# Patient Record
Sex: MALE | Race: WHITE | NOT HISPANIC OR LATINO | Employment: FULL TIME | URBAN - METROPOLITAN AREA
[De-identification: names, ages, dates, MRNs, and addresses within clinical notes are randomized per-mention and may not be internally consistent; named-entity substitution may affect disease eponyms.]

---

## 2017-01-25 ENCOUNTER — HOSPITAL ENCOUNTER (EMERGENCY)
Facility: HOSPITAL | Age: 52
Discharge: HOME | End: 2017-01-25
Admitting: EMERGENCY MEDICINE
Payer: OTHER MISCELLANEOUS

## 2017-01-25 VITALS
BODY MASS INDEX: 26.36 KG/M2 | HEART RATE: 75 BPM | OXYGEN SATURATION: 97 % | DIASTOLIC BLOOD PRESSURE: 94 MMHG | WEIGHT: 178 LBS | TEMPERATURE: 97.9 F | SYSTOLIC BLOOD PRESSURE: 148 MMHG | HEIGHT: 69 IN | RESPIRATION RATE: 20 BRPM

## 2017-01-25 DIAGNOSIS — S39.012A LOW BACK STRAIN, INITIAL ENCOUNTER: Primary | ICD-10-CM

## 2017-01-25 PROCEDURE — 99283 EMERGENCY DEPT VISIT LOW MDM: CPT

## 2017-01-25 PROCEDURE — 96372 THER/PROPH/DIAG INJ SC/IM: CPT

## 2017-01-25 RX ORDER — DIAZEPAM 5 MG/ML
5 INJECTION, SOLUTION INTRAMUSCULAR; INTRAVENOUS ONCE
Status: COMPLETED | OUTPATIENT
Start: 2017-01-25 | End: 2017-01-25

## 2017-01-25 RX ORDER — KETOROLAC TROMETHAMINE 30 MG/ML
15 INJECTION, SOLUTION INTRAMUSCULAR; INTRAVENOUS ONCE
Status: COMPLETED | OUTPATIENT
Start: 2017-01-25 | End: 2017-01-25

## 2017-01-25 RX ORDER — ESOMEPRAZOLE MAGNESIUM 40 MG/1
40 CAPSULE, DELAYED RELEASE ORAL
COMMUNITY

## 2017-01-25 RX ORDER — BACLOFEN 10 MG/1
10 TABLET ORAL 3 TIMES DAILY
Qty: 12 TABLET | Refills: 0 | Status: SHIPPED | OUTPATIENT
Start: 2017-01-25 | End: 2019-01-17

## 2017-01-25 RX ORDER — SERTRALINE HYDROCHLORIDE 25 MG/1
25 TABLET, FILM COATED ORAL DAILY
COMMUNITY
End: 2019-01-17

## 2017-01-25 RX ORDER — METHYLPREDNISOLONE 4 MG/1
TABLET ORAL
Qty: 21 TABLET | Refills: 0 | Status: SHIPPED | OUTPATIENT
Start: 2017-01-25 | End: 2019-01-17

## 2017-01-25 RX ADMIN — KETOROLAC TROMETHAMINE 15 MG: 30 INJECTION, SOLUTION INTRAMUSCULAR at 11:06

## 2017-01-25 RX ADMIN — DIAZEPAM 5 MG: 5 INJECTION, SOLUTION INTRAMUSCULAR; INTRAVENOUS at 11:06

## 2017-08-23 ENCOUNTER — ALLSCRIPTS OFFICE VISIT (OUTPATIENT)
Dept: OTHER | Facility: OTHER | Age: 52
End: 2017-08-23

## 2017-08-23 DIAGNOSIS — Z13.220 ENCOUNTER FOR SCREENING FOR LIPOID DISORDERS: ICD-10-CM

## 2017-10-25 ENCOUNTER — ALLSCRIPTS OFFICE VISIT (OUTPATIENT)
Dept: OTHER | Facility: OTHER | Age: 52
End: 2017-10-25

## 2017-10-26 NOTE — PROGRESS NOTES
Assessment  1  Pain of left hand (729 5) (M79 642)   2  Snoring (786 09) (R06 83)   3  Insomnia (780 52) (G47 00)    Plan  Health Maintenance    · *1 - [facility]  *  Status: Active  Requested for: 50BBS1883  Care Summary provided  : Yes  Insomnia, Snoring    · *Polysomnography, Sleep Study, Diagnostic; Status:Need Information - Financial Authorization; Requested MEETA:[id number];   there any other medical conditions or medications that would explain these      symptoms? : No  is the sleep disturbance affecting the patient's ability to function? : fatique  History/Symptoms: : snoring  Study Only or Consult : Sleep Study and Consult and F/U with Sleep Specialist    Discussion/Summary  Counseling Documentation With Imm: The patient was counseled regarding instructions for management,-- risk factor reductions,-- prognosis,-- patient and family education,-- risks and benefits of treatment options,-- importance of compliance with treatment  Medication SE Review and Pt Understands Tx: Possible side effects of new medications were reviewed with the patient/guardian today  The treatment plan was reviewed with the patient/guardian  The patient/guardian understands and agrees with the treatment plan      Chief Complaint  Chief Complaint Free Text Note Form: ER follow up - infected left hand      History of Present Illness  HPI: He was see in [facility] ER 2 days ago with a swollen painful left hand  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  An XR was reportedly don and neg  But he did have a low grade fever and chills and was treated for a cellulitis with Keflex  The and swelling has not gotten better  On a side note he c/o chronic insomnia and snoring   He has daytime somnolence      Review of Systems  Complete-Male:   Constitutional: as noted in HPI     Cardiovascular: No complaints of slow heart rate, no fast heart rate, no chest pain, no palpitations, no leg claudication, no lower extremity  Respiratory: as noted in HPI  Musculoskeletal: as noted in HPI  Integumentary: as noted in HPI  Active Problems  1  Depression (311) (F32 9)   2  Encounter for colorectal cancer screening (V76 51) (Z12 11,Z12 12)   3  Encounter for prostate cancer screening (V76 44) (Z12 5)   4  Esophageal reflux (530 81) (K21 9)   5  Screening for lipoid disorders (V77 91) (Z13 220)    Past Medical History  1  History of Dental disorder (525 9) (K08 9)   2  History of chalazion (V12 49) (Z31 69)  Active Problems And Past Medical History Reviewed: The active problems and past medical history were reviewed and updated today  Surgical History  1  History of Appendectomy   2  History of Hernia Repair Inguinal Sliding   3  History of Tonsillectomy    Family History  Mother    1  Family history of Old age    Social History   · Being A Social Drinker   · Former smoker (N45 00) (J72 492)  Social History Reviewed: The social history was reviewed and updated today  The social history was reviewed and is unchanged  Current Meds   1  NexIUM 40 MG Oral Capsule Delayed Release; TAKE 1 CAPSULE DAILY; Therapy: 12Jun2012 to ([facility])  Requested for: 12Jun2012; Last Rx:12Jun2012   Ordered   2  Sertraline HCl - 50 MG Oral Tablet; TAKE 1 TABLET DAILY AS DIRECTED; Therapy: 55QED7026 to (Evaluate:34Kiu7214)  Requested for: 03Dkg5043; Last Rx:05Loe2777   Ordered  Medication List Reviewed: The medication list was reviewed and updated today  Allergies  1  No Known Drug Allergies    Vitals  Vital Signs    Recorded: 85SUC8591 03:48PM   Temperature 98 6 F, Tympanic   Heart Rate 72   Systolic 991   Diastolic 82   Height 5 ft 9 in   Weight 188 lb 9 6 oz   BMI Calculated 27 85   BSA Calculated 2 01     Physical Exam    Constitutional   General appearance: No acute distress, well appearing and well nourished  Eyes   Conjunctiva and lids: No swelling, erythema, or discharge  Pupils and irises: Equal, round and reactive to light  Pulmonary   Respiratory effort: No increased work of breathing or signs of respiratory distress  Auscultation of lungs: Clear to auscultation, equal breath sounds bilaterally, no wheezes, no rales, no rhonci  Cardiovascular   Examination of extremities for edema and/or varicosities: Normal     Musculoskeletal   Gait and station: Normal     Digits and nails: Abnormal  -- fluculant mass dorum lef hand  Skin   Skin and subcutaneous tissue: Normal without rashes or lesions  Neurologic   Cranial nerves: Cranial nerves 2-12 intact      Psychiatric   Orientation to person, place and time: Normal          Signatures   Electronically signed by : ADAM Silver ; Oct 25 2017  9:21PM EST                       (Author)

## 2018-01-12 VITALS
SYSTOLIC BLOOD PRESSURE: 120 MMHG | WEIGHT: 186 LBS | BODY MASS INDEX: 27.55 KG/M2 | DIASTOLIC BLOOD PRESSURE: 80 MMHG | HEART RATE: 76 BPM | TEMPERATURE: 98.4 F | HEIGHT: 69 IN

## 2018-01-14 VITALS
HEIGHT: 69 IN | BODY MASS INDEX: 27.93 KG/M2 | WEIGHT: 188.6 LBS | HEART RATE: 72 BPM | TEMPERATURE: 98.6 F | SYSTOLIC BLOOD PRESSURE: 118 MMHG | DIASTOLIC BLOOD PRESSURE: 82 MMHG

## 2018-11-15 DIAGNOSIS — F32.A DEPRESSION, UNSPECIFIED DEPRESSION TYPE: Primary | ICD-10-CM

## 2019-01-17 ENCOUNTER — OFFICE VISIT (OUTPATIENT)
Dept: INTERNAL MEDICINE CLINIC | Age: 54
End: 2019-01-17
Payer: COMMERCIAL

## 2019-01-17 VITALS
BODY MASS INDEX: 27.61 KG/M2 | HEIGHT: 69 IN | OXYGEN SATURATION: 96 % | SYSTOLIC BLOOD PRESSURE: 110 MMHG | HEART RATE: 68 BPM | DIASTOLIC BLOOD PRESSURE: 70 MMHG | TEMPERATURE: 97.8 F | WEIGHT: 186.4 LBS

## 2019-01-17 DIAGNOSIS — F32.A DEPRESSION, UNSPECIFIED DEPRESSION TYPE: ICD-10-CM

## 2019-01-17 DIAGNOSIS — K21.9 GASTROESOPHAGEAL REFLUX DISEASE, ESOPHAGITIS PRESENCE NOT SPECIFIED: Primary | ICD-10-CM

## 2019-01-17 DIAGNOSIS — Z12.11 ENCOUNTER FOR SCREENING COLONOSCOPY: ICD-10-CM

## 2019-01-17 DIAGNOSIS — Z12.5 SCREENING FOR PROSTATE CANCER: ICD-10-CM

## 2019-01-17 DIAGNOSIS — Z13.220 SCREENING, LIPID: ICD-10-CM

## 2019-01-17 DIAGNOSIS — R06.83 SNORING: ICD-10-CM

## 2019-01-17 DIAGNOSIS — F41.9 ANXIETY: ICD-10-CM

## 2019-01-17 DIAGNOSIS — G47.00 INSOMNIA, UNSPECIFIED TYPE: ICD-10-CM

## 2019-01-17 PROCEDURE — 3008F BODY MASS INDEX DOCD: CPT | Performed by: INTERNAL MEDICINE

## 2019-01-17 PROCEDURE — 99213 OFFICE O/P EST LOW 20 MIN: CPT | Performed by: INTERNAL MEDICINE

## 2019-01-17 PROCEDURE — 1036F TOBACCO NON-USER: CPT | Performed by: INTERNAL MEDICINE

## 2019-01-17 RX ORDER — EPINEPHRINE 0.3 MG/.3ML
0.3 INJECTION SUBCUTANEOUS
Refills: 1 | COMMUNITY
Start: 2018-12-18 | End: 2021-06-07 | Stop reason: SDUPTHER

## 2019-01-17 NOTE — ASSESSMENT & PLAN NOTE
Patient continues to have some anxiety/depression for which she was taking Zoloft 25 mg    But due to recent stressors he has increased it to 50

## 2019-01-17 NOTE — ASSESSMENT & PLAN NOTE
Patient has had long-term on difficulty falling asleep and staying asleep  His significant other on notes that he snores loudly on a regular basis with gasping    Will send for sleep study

## 2019-01-17 NOTE — PROGRESS NOTES
Assessment/Plan:    Esophageal reflux  Patient states his lines takes Nexium has no indigestion    Anxiety  Patient continues to have some anxiety/depression for which she was taking Zoloft 25 mg  But due to recent stressors he has increased it to 50    Insomnia  Patient has had long-term on difficulty falling asleep and staying asleep  His significant other on notes that he snores loudly on a regular basis with gasping  Will send for sleep study    Snoring  Patient snores loudly in frequently was gasping for air  Needs to sleep in bowel       Diagnoses and all orders for this visit:    Gastroesophageal reflux disease, esophagitis presence not specified  -     CBC and differential; Future    Anxiety  -     Comprehensive metabolic panel; Future  -     T4, free; Future    Insomnia, unspecified type    Snoring  -     Home Study; Future    Encounter for screening colonoscopy  -     Occult Blood, Fecal Immunochemical; Future    Screening for prostate cancer    Screening, lipid  -     Lipid panel; Future  -     TSH baseline; Future    Depression, unspecified depression type    Other orders  -     EPINEPHrine (EPIPEN) 0 3 mg/0 3 mL SOAJ; Inject 0 3 mg into a muscle As directed          Subjective:      Patient ID: Orlando Stewart is a 47 y o  male  Patient is here just for some routine blood work and a checkup  He is feeling little under stressed would like to increase his Zoloft  He is also due for blood work and his significant other feels he needs a sleep study      Anxiety   Symptoms include nervous/anxious behavior  Patient reports no chest pain, confusion, dizziness, nausea, palpitations, shortness of breath or suicidal ideas  Review of Systems   Constitutional: Negative for chills, fatigue, fever and unexpected weight change  HENT: Negative for congestion, ear pain, hearing loss, postnasal drip, sinus pressure, sore throat, trouble swallowing and voice change      Eyes: Negative for visual disturbance  Respiratory: Negative for cough, chest tightness, shortness of breath and wheezing  Cardiovascular: Negative for chest pain, palpitations and leg swelling  Gastrointestinal: Negative for abdominal distention, abdominal pain, anal bleeding, blood in stool, constipation, diarrhea and nausea  Endocrine: Negative for cold intolerance, polydipsia, polyphagia and polyuria  Genitourinary: Negative for dysuria, flank pain, frequency, hematuria and urgency  Musculoskeletal: Negative for arthralgias, back pain, gait problem, joint swelling, myalgias and neck pain  Skin: Negative for rash  Allergic/Immunologic: Negative for immunocompromised state  Neurological: Negative for dizziness, syncope, facial asymmetry, weakness, light-headedness, numbness and headaches  Hematological: Negative for adenopathy  Psychiatric/Behavioral: Positive for sleep disturbance  Negative for confusion and suicidal ideas  The patient is nervous/anxious  Objective:      /70 (BP Location: Left arm, Patient Position: Sitting)   Pulse 68   Temp 97 8 °F (36 6 °C) (Tympanic)   Ht 5' 9" (1 753 m)   Wt 84 6 kg (186 lb 6 4 oz)   SpO2 96%   BMI 27 53 kg/m²          Physical Exam   Constitutional: He is oriented to person, place, and time  He appears well-developed and well-nourished  No distress  HENT:   Right Ear: External ear normal    Left Ear: External ear normal    Nose: Nose normal    Mouth/Throat: Oropharynx is clear and moist  No oropharyngeal exudate  Eyes: Pupils are equal, round, and reactive to light  EOM are normal    Neck: Normal range of motion  Neck supple  No JVD present  No thyromegaly present  Cardiovascular: Normal rate, regular rhythm, normal heart sounds and intact distal pulses  Exam reveals no gallop  No murmur heard  Pulmonary/Chest: Effort normal and breath sounds normal  No respiratory distress  He has no wheezes  He has no rales  Abdominal: Soft   Bowel sounds are normal  He exhibits no distension and no mass  There is no tenderness  Musculoskeletal: Normal range of motion  He exhibits no tenderness  Lymphadenopathy:     He has no cervical adenopathy  Neurological: He is alert and oriented to person, place, and time  No cranial nerve deficit  Coordination normal    Skin: No rash noted  Psychiatric: He has a normal mood and affect   His behavior is normal  Judgment and thought content normal

## 2019-01-17 NOTE — PATIENT INSTRUCTIONS
Insomnia   AMBULATORY CARE:   Insomnia  is a condition that makes it hard to fall or stay asleep  Lack of sleep can lead to attention or memory problems during the day  You may also be warner, depressed, clumsy, or have headaches  Contact your healthcare provider if:   · Your symptoms do not get better, or they get worse  · You begin to use drugs or alcohol to fall asleep  · You have questions or concerns about your condition or care  Medicines:   · Medicines  may help you sleep more regularly or help you feel less anxious  · Take your medicine as directed  Contact your healthcare provider if you think your medicine is not helping or if you have side effects  Tell him or her if you are allergic to any medicine  Keep a list of the medicines, vitamins, and herbs you take  Include the amounts, and when and why you take them  Bring the list or the pill bottles to follow-up visits  Carry your medicine list with you in case of an emergency  What you can do to improve your sleep:   · Create a sleep schedule  This will help you form a sleep routine  Keep a record of your sleep patterns, and any sleeping problems you have  Bring the record to follow-up visits with healthcare providers  · Do not take naps  Naps could make it hard for you to fall asleep at bedtime  · Keep your bedroom cool, quiet, and dark  Turn on white noise, such as a fan, to help you relax  Do not use your bed for any activity that will keep you awake  Do not read, exercise, eat, or watch TV in your bedroom  · Get up if you do not fall asleep within 20 minutes  Move to another room and do something relaxing until you become sleepy  · Limit caffeine, alcohol, and food to earlier in the day  Only drink caffeine in the morning  Do not drink alcohol within 6 hours of bedtime  Do not eat a heavy meal right before you go to bed  · Exercise regularly  Daily exercise may help you sleep better   Do not exercise within 4 hours of bedtime  Follow up with your healthcare provider as directed: Your healthcare provider may refer you for cognitive behavioral therapy  A behavioral therapist may help you find ways to relax, decrease stress, and improve sleep  Write down your questions so you remember to ask them during your visits  © 2017 [facility] Information is for End User's use only and may not be sold, redistributed or otherwise used for commercial purposes  All illustrations and images included in CareNotes® are the copyrighted property of [employer] , [employer]  or [employer]  The above information is an  only  It is not intended as medical advice for individual conditions or treatments  Talk to your doctor, nurse or pharmacist before following any medical regimen to see if it is safe and effective for you

## 2019-01-25 ENCOUNTER — TELEPHONE (OUTPATIENT)
Dept: SLEEP CENTER | Facility: CLINIC | Age: 54
End: 2019-01-25

## 2019-01-25 NOTE — TELEPHONE ENCOUNTER
----- Message from Randy Quinones MD sent at 1/24/2019  3:23 PM EST -----  approve  ----- Message -----  From: Sanjeev Nayak: 1/22/2019   9:59 AM  To: Sleep Medicine [facility] [place], #    PLEASE REVIEW FOR APPROVAL OR DENIAL AND WHY

## 2019-02-27 ENCOUNTER — HOSPITAL ENCOUNTER (OUTPATIENT)
Dept: SLEEP CENTER | Facility: CLINIC | Age: 54
Discharge: HOME | End: 2019-02-27
Payer: COMMERCIAL

## 2019-02-27 DIAGNOSIS — R06.83 SNORING: ICD-10-CM

## 2019-02-27 PROCEDURE — G0399 HOME SLEEP TEST/TYPE 3 PORTA: HCPCS

## 2019-02-27 PROCEDURE — 95806 SLEEP STUDY UNATT&RESP EFFT: CPT | Performed by: INTERNAL MEDICINE

## 2019-02-28 DIAGNOSIS — G47.33 OSA (OBSTRUCTIVE SLEEP APNEA): Primary | ICD-10-CM

## 2019-03-01 ENCOUNTER — TELEPHONE (OUTPATIENT)
Dept: SLEEP CENTER | Facility: CLINIC | Age: 54
End: 2019-03-01

## 2019-03-01 NOTE — TELEPHONE ENCOUNTER
Discussed study results W PT- advised PT to keep appointment for CPAP study 4/3 @ [facility] & pulmonary consult 4/19 to review study results- PT verbalizes understanding of same

## 2019-04-03 ENCOUNTER — HOSPITAL ENCOUNTER (OUTPATIENT)
Dept: SLEEP CENTER | Facility: CLINIC | Age: 54
Discharge: HOME | End: 2019-04-03
Payer: COMMERCIAL

## 2019-04-03 DIAGNOSIS — G47.33 OSA (OBSTRUCTIVE SLEEP APNEA): ICD-10-CM

## 2019-04-03 PROCEDURE — 95811 POLYSOM 6/>YRS CPAP 4/> PARM: CPT

## 2019-04-03 PROCEDURE — 95811 POLYSOM 6/>YRS CPAP 4/> PARM: CPT | Performed by: INTERNAL MEDICINE

## 2019-04-08 ENCOUNTER — TELEPHONE (OUTPATIENT)
Dept: PULMONOLOGY | Facility: CLINIC | Age: 54
End: 2019-04-08

## 2019-04-08 DIAGNOSIS — G47.33 OSA (OBSTRUCTIVE SLEEP APNEA): Primary | ICD-10-CM

## 2019-04-17 ENCOUNTER — TELEPHONE (OUTPATIENT)
Dept: PULMONOLOGY | Facility: CLINIC | Age: 54
End: 2019-04-17

## 2019-06-02 DIAGNOSIS — F32.A DEPRESSION, UNSPECIFIED DEPRESSION TYPE: ICD-10-CM

## 2020-10-09 DIAGNOSIS — F32.A DEPRESSION, UNSPECIFIED DEPRESSION TYPE: ICD-10-CM

## 2020-10-29 ENCOUNTER — OFFICE VISIT (OUTPATIENT)
Dept: INTERNAL MEDICINE CLINIC | Age: 55
End: 2020-10-29
Payer: COMMERCIAL

## 2020-10-29 VITALS
TEMPERATURE: 98.2 F | HEIGHT: 69 IN | HEART RATE: 76 BPM | BODY MASS INDEX: 28.14 KG/M2 | DIASTOLIC BLOOD PRESSURE: 80 MMHG | WEIGHT: 190 LBS | OXYGEN SATURATION: 97 % | SYSTOLIC BLOOD PRESSURE: 132 MMHG

## 2020-10-29 DIAGNOSIS — G47.33 OSA (OBSTRUCTIVE SLEEP APNEA): ICD-10-CM

## 2020-10-29 DIAGNOSIS — F32.A DEPRESSION, UNSPECIFIED DEPRESSION TYPE: ICD-10-CM

## 2020-10-29 DIAGNOSIS — Z13.220 SCREENING, LIPID: ICD-10-CM

## 2020-10-29 DIAGNOSIS — Z12.11 SCREEN FOR COLON CANCER: Primary | ICD-10-CM

## 2020-10-29 DIAGNOSIS — F41.9 ANXIETY: ICD-10-CM

## 2020-10-29 DIAGNOSIS — Z00.00 ANNUAL PHYSICAL EXAM: ICD-10-CM

## 2020-10-29 DIAGNOSIS — Z12.5 SCREENING FOR PROSTATE CANCER: ICD-10-CM

## 2020-10-29 PROCEDURE — 3725F SCREEN DEPRESSION PERFORMED: CPT | Performed by: INTERNAL MEDICINE

## 2020-10-29 PROCEDURE — 3008F BODY MASS INDEX DOCD: CPT | Performed by: INTERNAL MEDICINE

## 2020-10-29 PROCEDURE — 99396 PREV VISIT EST AGE 40-64: CPT | Performed by: INTERNAL MEDICINE

## 2020-10-29 RX ORDER — SERTRALINE HYDROCHLORIDE 100 MG/1
100 TABLET, FILM COATED ORAL DAILY
Qty: 90 TABLET | Refills: 1 | Status: SHIPPED | OUTPATIENT
Start: 2020-10-29 | End: 2021-02-22

## 2020-10-29 RX ORDER — ALPRAZOLAM 0.5 MG/1
0.5 TABLET ORAL 2 TIMES DAILY PRN
Qty: 60 TABLET | Refills: 1 | Status: CANCELLED | OUTPATIENT
Start: 2020-10-29

## 2020-10-29 RX ORDER — ALPRAZOLAM 0.25 MG/1
0.5 TABLET ORAL 2 TIMES DAILY PRN
Qty: 60 TABLET | Refills: 0 | Status: SHIPPED | OUTPATIENT
Start: 2020-10-29 | End: 2022-05-13

## 2020-11-03 DIAGNOSIS — F32.A DEPRESSION, UNSPECIFIED DEPRESSION TYPE: ICD-10-CM

## 2020-12-04 ENCOUNTER — LAB (OUTPATIENT)
Dept: LAB | Facility: HOSPITAL | Age: 55
End: 2020-12-04
Payer: COMMERCIAL

## 2020-12-04 DIAGNOSIS — F41.9 ANXIETY: ICD-10-CM

## 2020-12-04 DIAGNOSIS — Z13.220 SCREENING, LIPID: ICD-10-CM

## 2020-12-04 DIAGNOSIS — Z12.5 SCREENING FOR PROSTATE CANCER: ICD-10-CM

## 2020-12-04 LAB
ALBUMIN SERPL BCP-MCNC: 3.9 G/DL (ref 3.5–5)
ALP SERPL-CCNC: 59 U/L (ref 46–116)
ALT SERPL W P-5'-P-CCNC: 47 U/L (ref 12–78)
ANION GAP SERPL CALCULATED.3IONS-SCNC: 6 MMOL/L (ref 4–13)
AST SERPL W P-5'-P-CCNC: 20 U/L (ref 5–45)
BILIRUB SERPL-MCNC: 0.4 MG/DL (ref 0.2–1)
BUN SERPL-MCNC: 24 MG/DL (ref 5–25)
CALCIUM SERPL-MCNC: 9.3 MG/DL (ref 8.3–10.1)
CHLORIDE SERPL-SCNC: 105 MMOL/L (ref 100–108)
CHOLEST SERPL-MCNC: 232 MG/DL (ref 50–200)
CO2 SERPL-SCNC: 30 MMOL/L (ref 21–32)
CREAT SERPL-MCNC: 1.03 MG/DL (ref 0.6–1.3)
GFR SERPL CREATININE-BSD FRML MDRD: 82 ML/MIN/1.73SQ M
GLUCOSE P FAST SERPL-MCNC: 117 MG/DL (ref 65–99)
HDLC SERPL-MCNC: 58 MG/DL
LDLC SERPL CALC-MCNC: 148 MG/DL (ref 0–100)
NONHDLC SERPL-MCNC: 174 MG/DL
POTASSIUM SERPL-SCNC: 4.7 MMOL/L (ref 3.5–5.3)
PROT SERPL-MCNC: 7.4 G/DL (ref 6.4–8.2)
PSA SERPL-MCNC: 0.9 NG/ML (ref 0–4)
SODIUM SERPL-SCNC: 141 MMOL/L (ref 136–145)
T4 FREE SERPL-MCNC: 0.76 NG/DL (ref 0.76–1.46)
TRIGL SERPL-MCNC: 129 MG/DL

## 2020-12-04 PROCEDURE — 80061 LIPID PANEL: CPT

## 2020-12-04 PROCEDURE — 36415 COLL VENOUS BLD VENIPUNCTURE: CPT

## 2020-12-04 PROCEDURE — 84439 ASSAY OF FREE THYROXINE: CPT

## 2020-12-04 PROCEDURE — G0103 PSA SCREENING: HCPCS

## 2020-12-04 PROCEDURE — 80053 COMPREHEN METABOLIC PANEL: CPT

## 2021-01-12 ENCOUNTER — CONSULT (OUTPATIENT)
Dept: PULMONOLOGY | Facility: CLINIC | Age: 56
End: 2021-01-12
Payer: COMMERCIAL

## 2021-01-12 VITALS
HEIGHT: 69 IN | HEART RATE: 70 BPM | TEMPERATURE: 97.9 F | SYSTOLIC BLOOD PRESSURE: 120 MMHG | DIASTOLIC BLOOD PRESSURE: 84 MMHG | OXYGEN SATURATION: 96 % | BODY MASS INDEX: 28.73 KG/M2 | WEIGHT: 194 LBS

## 2021-01-12 DIAGNOSIS — G47.33 OSA (OBSTRUCTIVE SLEEP APNEA): Primary | ICD-10-CM

## 2021-01-12 DIAGNOSIS — E66.3 OVERWEIGHT (BMI 25.0-29.9): ICD-10-CM

## 2021-01-12 PROCEDURE — 99204 OFFICE O/P NEW MOD 45 MIN: CPT | Performed by: INTERNAL MEDICINE

## 2021-01-12 PROCEDURE — 3008F BODY MASS INDEX DOCD: CPT | Performed by: INTERNAL MEDICINE

## 2021-01-12 PROCEDURE — 1036F TOBACCO NON-USER: CPT | Performed by: INTERNAL MEDICINE

## 2021-01-12 NOTE — PROGRESS NOTES
Assessment/Plan:     Diagnoses and all orders for this visit:    LEONIE (obstructive sleep apnea)  -     Ambulatory referral to Sleep Medicine    Overweight (BMI 25 0-29 9)      obstructive sleep apnea etiology pathogenesis discussed with the patient in detail  Reviewed his home sleep study in detail, with severe obstructive sleep apnea with an AHI of 32  Consequences of untreated sleep apnea including excessive daytime sleepiness, increased risk for myocardial infarction stroke atrial fibrillation discussed  Also discussed his he CPAP titration study in detail when he was titrated to a CPAP of 10 cm of water  He states he has been consistently using his CPAP machine  And has been benefiting with the use of the CPAP machine with decreased nocturnal awakenings and feels well rested when he is up in the morning  [place] sleepiness score of 4 today  Again discussed the need for compliance with the CPAP machine understands and verbalizes  Cleaning of the supplies and change of CPAP supplies discussed  Will try to obtain the download compliance for the past 6 months from [facility]   Review for acceptable residual AHI is if any need for change of pressure will be discussed again with the patient  Had discussion with the patient with regards to weight loss he states he has been working on it  Will see him back in 6 months or p r n  Earlier as needed  Return in about 6 months (around 7/12/2021)  All questions are answered to the patient's satisfaction and understanding  He verbalizes understanding  He is encouraged to call with any further questions or concerns  Portions of the record may have been created with voice recognition software  Occasional wrong word or "sound a like" substitutions may have occurred due to the inherent limitations of voice recognition software  Read the chart carefully and recognize, using context, where substitutions have occurred  a    Electronically Signed by Gurjit Hung MD    ______________________________________________________________________    Chief Complaint:   Chief Complaint   Patient presents with    Sleep Apnea        Patient ID: Stephani Belle is a 48 y o  y o  male has a past medical history of GERD (gastroesophageal reflux disease), LEONIE (obstructive sleep apnea), and Psychiatric disorder  1/12/2021  Patient presents today for initial visit  Stephani Belle is a very pleasant 52-year-old gentleman, who works for boy  [employer], does not hold any CD[employer] license thing  He was diagnosed with obstructive sleep apnea severe with an AHI of 32 by home sleep study approximately 2 years ago and he also had a CPAP titration study done and he was ordered for a CPAP 10 cm of water which he states he has been consistently using since then  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   Pets/Enviroment:  None  Travel history:  None  Review of Systems   Constitutional: Negative  HENT: Negative  Eyes: Negative  Respiratory: Negative  Cardiovascular: Negative  Gastrointestinal: Negative  Endocrine: Negative  Genitourinary: Negative  Musculoskeletal: Negative  Skin: Negative  Allergic/Immunologic: Negative  Neurological: Negative  Hematological: Negative  Psychiatric/Behavioral: The patient is nervous/anxious  Social history: He reports that he has quit smoking  His smokeless tobacco use includes snuff  He reports current alcohol use  He reports that he does not use drugs  Past surgical history: History reviewed  No pertinent surgical history  Family history:   Family History   Family history unknown: Yes         There is no immunization history on file for this patient    Current Outpatient Medications   Medication Sig Dispense Refill    ALPRAZolam (XANAX) 0 25 mg tablet Take 2 tablets (0 5 mg total) by mouth 2 (two) times a day as needed for anxiety 60 tablet 0    EPINEPHrine (EPIPEN) 0 3 mg/0 3 mL SOAJ Inject 0 3 mg into a muscle As directed  1  esomeprazole (NexIUM) 40 MG capsule Take 40 mg by mouth every morning before breakfast      sertraline (ZOLOFT) 100 mg tablet Take 1 tablet (100 mg total) by mouth daily 90 tablet 1     No current facility-administered medications for this visit  Allergies: Patient has no known allergies  Objective:  Vitals:    01/12/21 0837   BP: 120/84   Pulse: 70   Temp: 97 9 °F (36 6 °C)   SpO2: 96%   Weight: 88 kg (194 lb)   Height: 5' 9" (1 753 m)   Oxygen Therapy  SpO2: 96 %    Wt Readings from Last 3 Encounters:   01/12/21 88 kg (194 lb)   10/29/20 86 2 kg (190 lb)   01/17/19 84 6 kg (186 lb 6 4 oz)     Body mass index is 28 65 kg/m²  Physical Exam  Constitutional:       Appearance: He is well-developed  HENT:      Head: Normocephalic and atraumatic  Eyes:      Pupils: Pupils are equal, round, and reactive to light  Neck:      Musculoskeletal: Normal range of motion and neck supple  Comments: Short and wide neck  Cardiovascular:      Rate and Rhythm: Normal rate and regular rhythm  Heart sounds: Normal heart sounds  Pulmonary:      Effort: Pulmonary effort is normal       Breath sounds: Normal breath sounds  Musculoskeletal: Normal range of motion  Skin:     General: Skin is warm and dry  Neurological:      Mental Status: He is alert and oriented to person, place, and time  Psychiatric:         Behavior: Behavior normal            Diagnostics:  I have personally reviewed pertinent reports      ESS: Total score: 4

## 2021-02-22 DIAGNOSIS — F32.A DEPRESSION, UNSPECIFIED DEPRESSION TYPE: ICD-10-CM

## 2021-02-22 RX ORDER — SERTRALINE HYDROCHLORIDE 100 MG/1
TABLET, FILM COATED ORAL
Qty: 30 TABLET | Refills: 5 | Status: SHIPPED | OUTPATIENT
Start: 2021-02-22 | End: 2022-02-04

## 2021-06-07 DIAGNOSIS — Z91.030 BEE STING ALLERGY: Primary | ICD-10-CM

## 2021-06-07 RX ORDER — EPINEPHRINE 0.3 MG/.3ML
0.3 INJECTION SUBCUTANEOUS ONCE
Qty: 1 EACH | Refills: 1 | Status: SHIPPED | OUTPATIENT
Start: 2021-06-07 | End: 2021-06-07

## 2021-06-15 ENCOUNTER — OFFICE VISIT (OUTPATIENT)
Dept: INTERNAL MEDICINE CLINIC | Age: 56
End: 2021-06-15
Payer: COMMERCIAL

## 2021-06-15 VITALS
HEIGHT: 69 IN | OXYGEN SATURATION: 98 % | BODY MASS INDEX: 28.44 KG/M2 | TEMPERATURE: 98 F | WEIGHT: 192 LBS | SYSTOLIC BLOOD PRESSURE: 136 MMHG | HEART RATE: 74 BPM | DIASTOLIC BLOOD PRESSURE: 78 MMHG

## 2021-06-15 DIAGNOSIS — G47.33 OSA ON CPAP: ICD-10-CM

## 2021-06-15 DIAGNOSIS — F41.9 ANXIETY: ICD-10-CM

## 2021-06-15 DIAGNOSIS — K21.9 GASTROESOPHAGEAL REFLUX DISEASE, UNSPECIFIED WHETHER ESOPHAGITIS PRESENT: ICD-10-CM

## 2021-06-15 DIAGNOSIS — Z99.89 OSA ON CPAP: ICD-10-CM

## 2021-06-15 DIAGNOSIS — Z00.00 ANNUAL PHYSICAL EXAM: Primary | ICD-10-CM

## 2021-06-15 PROBLEM — G47.00 INSOMNIA: Status: RESOLVED | Noted: 2017-10-25 | Resolved: 2021-06-15

## 2021-06-15 PROCEDURE — 3725F SCREEN DEPRESSION PERFORMED: CPT | Performed by: INTERNAL MEDICINE

## 2021-06-15 PROCEDURE — 99396 PREV VISIT EST AGE 40-64: CPT | Performed by: INTERNAL MEDICINE

## 2021-06-15 PROCEDURE — 3008F BODY MASS INDEX DOCD: CPT | Performed by: INTERNAL MEDICINE

## 2021-06-15 PROCEDURE — 1036F TOBACCO NON-USER: CPT | Performed by: INTERNAL MEDICINE

## 2021-06-15 NOTE — PATIENT INSTRUCTIONS
Wellness Visit for Adults   AMBULATORY CARE:   A wellness visit  is when you see your healthcare provider to get screened for health problems  Your healthcare provider will also give you advice on how to stay healthy  Write down your questions so you remember to ask them  Ask your healthcare provider how often you should have a wellness visit  What happens at a wellness visit:  Your healthcare provider will ask about your health, and your family history of health problems  This includes high blood pressure, heart disease, and cancer  He or she will ask if you have symptoms that concern you, if you smoke, and about your mood  You may also be asked about your intake of medicines, supplements, food, and alcohol  Any of the following may be done:  · Your weight  will be checked  Your height may also be checked so your body mass index (BMI) can be calculated  Your BMI shows if you are at a healthy weight  · Your blood pressure  and heart rate will be checked  Your temperature may also be checked  · Blood and urine tests  may be done  Blood tests may be done to check your cholesterol levels  Abnormal cholesterol levels increase your risk for heart disease and stroke  You may also need a blood or urine test to check for diabetes if you are at increased risk  Urine tests may be done to look for signs of an infection or kidney disease  · A physical exam  includes checking your heartbeat and lungs with a stethoscope  Your healthcare provider may also check your skin to look for sun damage  · Screening tests  may be recommended  A screening test is done to check for diseases that may not cause symptoms  The screening tests you may need depend on your age, gender, family history, and lifestyle habits  For example, colorectal screening may be recommended if you are 48years old or older  Screening tests you need if you are a woman:   · A Pap smear  is used to screen for cervical cancer   Pap smears are usually done every 3 to 5 years depending on your age  You may need them more often if you have had abnormal Pap smear test results in the past  Ask your healthcare provider how often you should have a Pap smear  · A mammogram  is an x-ray of your breasts to screen for breast cancer  Experts recommend mammograms every 2 years starting at age 48 years  You may need a mammogram at age 52 years or younger if you have an increased risk for breast cancer  Talk to your healthcare provider about when you should start having mammograms and how often you need them  Vaccines you may need:   · Get an influenza vaccine  every year  The influenza vaccine protects you from the flu  Several types of viruses cause the flu  The viruses change over time, so new vaccines are made each year  · Get a tetanus-diphtheria (Td) booster vaccine  every 10 years  This vaccine protects you against tetanus and diphtheria  Tetanus is a severe infection that may cause painful muscle spasms and lockjaw  Diphtheria is a severe bacterial infection that causes a thick covering in the back of your mouth and throat  · Get a human papillomavirus (HPV) vaccine  if you are female and aged 23 to 32 or male 23 to 24 and never received it  This vaccine protects you from HPV infection  HPV is the most common infection spread by sexual contact  HPV may also cause vaginal, penile, and anal cancers  · Get a pneumococcal vaccine  if you are aged 72 years or older  The pneumococcal vaccine is an injection given to protect you from pneumococcal disease  Pneumococcal disease is an infection caused by pneumococcal bacteria  The infection may cause pneumonia, meningitis, or an ear infection  · Get a shingles vaccine  if you are 60 or older, even if you have had shingles before  The shingles vaccine is an injection to protect you from the varicella-zoster virus  This is the same virus that causes chickenpox   Shingles is a painful rash that develops in people who had chickenpox or have been exposed to the virus  How to eat healthy:  My Plate is a model for planning healthy meals  It shows the types and amounts of foods that should go on your plate  Fruits and vegetables make up about half of your plate, and grains and protein make up the other half  A serving of dairy is included on the side of your plate  The amount of calories and serving sizes you need depends on your age, gender, weight, and height  Examples of healthy foods are listed below:  · Eat a variety of vegetables  such as dark green, red, and orange vegetables  You can also include canned vegetables low in sodium (salt) and frozen vegetables without added butter or sauces  · Eat a variety of fresh fruits , canned fruit in 100% juice, frozen fruit, and dried fruit  · Include whole grains  At least half of the grains you eat should be whole grains  Examples include whole-wheat bread, wheat pasta, brown rice, and whole-grain cereals such as oatmeal     · Eat a variety of protein foods such as seafood (fish and shellfish), lean meat, and poultry without skin (turkey and chicken)  Examples of lean meats include pork leg, shoulder, or tenderloin, and beef round, sirloin, tenderloin, and extra lean ground beef  Other protein foods include eggs and egg substitutes, beans, peas, soy products, nuts, and seeds  · Choose low-fat dairy products such as skim or 1% milk or low-fat yogurt, cheese, and cottage cheese  · Limit unhealthy fats  such as butter, hard margarine, and shortening  Exercise:  Exercise at least 30 minutes per day on most days of the week  Some examples of exercise include walking, biking, dancing, and swimming  You can also fit in more physical activity by taking the stairs instead of the elevator or parking farther away from stores  Include muscle strengthening activities 2 days each week  Regular exercise provides many health benefits   It helps you manage your weight, and decreases your risk for type 2 diabetes, heart disease, stroke, and high blood pressure  Exercise can also help improve your mood  Ask your healthcare provider about the best exercise plan for you  General health and safety guidelines:   · Do not smoke  Nicotine and other chemicals in cigarettes and cigars can cause lung damage  Ask your healthcare provider for information if you currently smoke and need help to quit  E-cigarettes or smokeless tobacco still contain nicotine  Talk to your healthcare provider before you use these products  · Limit alcohol  A drink of alcohol is 12 ounces of beer, 5 ounces of wine, or 1½ ounces of liquor  · Lose weight, if needed  Being overweight increases your risk of certain health conditions  These include heart disease, high blood pressure, type 2 diabetes, and certain types of cancer  · Protect your skin  Do not sunbathe or use tanning beds  Use sunscreen with a SPF 15 or higher  Apply sunscreen at least 15 minutes before you go outside  Reapply sunscreen every 2 hours  Wear protective clothing, hats, and sunglasses when you are outside  · Drive safely  Always wear your seatbelt  Make sure everyone in your car wears a seatbelt  A seatbelt can save your life if you are in an accident  Do not use your cell phone when you are driving  This could distract you and cause an accident  Pull over if you need to make a call or send a text message  · Practice safe sex  Use latex condoms if are sexually active and have more than one partner  Your healthcare provider may recommend screening tests for sexually transmitted infections (STIs)  · Wear helmets, lifejackets, and protective gear  Always wear a helmet when you ride a bike or motorcycle, go skiing, or play sports that could cause a head injury  Wear protective equipment when you play sports  Wear a lifejacket when you are on a boat or doing water sports      © Copyright [employer] 2020 Information is for End User's use only and may not be sold, redistributed or otherwise used for commercial purposes  All illustrations and images included in CareNotes® are the copyrighted property of [employer]  or [employer]  The above information is an  only  It is not intended as medical advice for individual conditions or treatments  Talk to your doctor, nurse or pharmacist before following any medical regimen to see if it is safe and effective for you  Weight Management   AMBULATORY CARE:   Why it is important to manage your weight:  Being overweight increases your risk of health conditions such as heart disease, high blood pressure, type 2 diabetes, and certain types of cancer  It can also increase your risk for osteoarthritis, sleep apnea, and other respiratory problems  Aim for a slow, steady weight loss  Even a small amount of weight loss can lower your risk of health problems  How to lose weight safely:  A safe and healthy way to lose weight is to eat fewer calories and get regular exercise  · You can lose up about 1 pound a week by decreasing the number of calories you eat by 500 calories each day  You can decrease calories by eating smaller portion sizes or by cutting out high-calorie foods  Read labels to find out how many calories are in the foods you eat  · You can also burn calories with exercise such as walking, swimming, or biking  You will be more likely to keep weight off if you make these changes part of your lifestyle  Exercise at least 30 minutes per day on most days of the week  You can also fit in more physical activity by taking the stairs instead of the elevator or parking farther away from stores  Ask your healthcare provider about the best exercise plan for you  Healthy meal plan for weight management:  A healthy meal plan includes a variety of foods, contains fewer calories, and helps you stay healthy   A healthy meal plan includes the following:     · Eat whole-grain foods more often  A healthy meal plan should contain fiber  Fiber is the part of grains, fruits, and vegetables that is not broken down by your body  Whole-grain foods are healthy and provide extra fiber in your diet  Some examples of whole-grain foods are whole-wheat breads and pastas, oatmeal, brown rice, and bulgur  · Eat a variety of vegetables every day  Include dark, leafy greens such as spinach, kale, mercedes greens, and mustard greens  Eat yellow and orange vegetables such as carrots, sweet potatoes, and winter squash  · Eat a variety of fruits every day  Choose fresh or canned fruit (canned in its own juice or light syrup) instead of juice  Fruit juice has very little or no fiber  · Eat low-fat dairy foods  Drink fat-free (skim) milk or 1% milk  Eat fat-free yogurt and low-fat cottage cheese  Try low-fat cheeses such as mozzarella and other reduced-fat cheeses  · Choose meat and other protein foods that are low in fat  Choose beans or other legumes such as split peas or lentils  Choose fish, skinless poultry (chicken or turkey), or lean cuts of red meat (beef or pork)  Before you cook meat or poultry, cut off any visible fat  · Use less fat and oil  Try baking foods instead of frying them  Add less fat, such as margarine, sour cream, regular salad dressing and mayonnaise to foods  Eat fewer high-fat foods  Some examples of high-fat foods include french fries, doughnuts, ice cream, and cakes  · Eat fewer sweets  Limit foods and drinks that are high in sugar  This includes candy, cookies, regular soda, and sweetened drinks  Ways to decrease calories:   · Eat smaller portions  ? Use a small plate with smaller servings  ? Do not eat second helpings  ? When you eat at a restaurant, ask for a box and place half of your meal in the box before you eat  ? Share an entrée with someone else  · Replace high-calorie snacks with healthy, low-calorie snacks  ? Choose fresh fruit, vegetables, fat-free rice cakes, or air-popped popcorn instead of potato chips, nuts, or chocolate  ? Choose water or calorie-free drinks instead of soda or sweetened drinks  · Do not shop for groceries when you are hungry  You may be more likely to make unhealthy food choices  Take a grocery list of healthy foods and shop after you have eaten  · Eat regular meals  Do not skip meals  Skipping meals can lead to overeating later in the day  This can make it harder for you to lose weight  Eat a healthy snack in place of a meal if you do not have time to eat a regular meal  Talk with a dietitian to help you create a meal plan and schedule that is right for you  Other things to consider as you try to lose weight:   · Be aware of situations that may give you the urge to overeat, such as eating while watching television  Find ways to avoid these situations  For example, read a book, go for a walk, or do crafts  · Meet with a weight loss support group or friends who are also trying to lose weight  This may help you stay motivated to continue working on your weight loss goals  © Copyright [address] Information is for End User's use only and may not be sold, redistributed or otherwise used for commercial purposes  All illustrations and images included in CareNotes® are the copyrighted property of [employer]  or [facility] [employer]   The above information is an  only  It is not intended as medical advice for individual conditions or treatments  Talk to your doctor, nurse or pharmacist before following any medical regimen to see if it is safe and effective for you  Weight Management   AMBULATORY CARE:   Why it is important to manage your weight:  Being overweight increases your risk of health conditions such as heart disease, high blood pressure, type 2 diabetes, and certain types of cancer   It can also increase your risk for osteoarthritis, sleep apnea, and other respiratory problems  Aim for a slow, steady weight loss  Even a small amount of weight loss can lower your risk of health problems  How to lose weight safely:  A safe and healthy way to lose weight is to eat fewer calories and get regular exercise  · You can lose up about 1 pound a week by decreasing the number of calories you eat by 500 calories each day  You can decrease calories by eating smaller portion sizes or by cutting out high-calorie foods  Read labels to find out how many calories are in the foods you eat  · You can also burn calories with exercise such as walking, swimming, or biking  You will be more likely to keep weight off if you make these changes part of your lifestyle  Exercise at least 30 minutes per day on most days of the week  You can also fit in more physical activity by taking the stairs instead of the elevator or parking farther away from stores  Ask your healthcare provider about the best exercise plan for you  Healthy meal plan for weight management:  A healthy meal plan includes a variety of foods, contains fewer calories, and helps you stay healthy  A healthy meal plan includes the following:     · Eat whole-grain foods more often  A healthy meal plan should contain fiber  Fiber is the part of grains, fruits, and vegetables that is not broken down by your body  Whole-grain foods are healthy and provide extra fiber in your diet  Some examples of whole-grain foods are whole-wheat breads and pastas, oatmeal, brown rice, and bulgur  · Eat a variety of vegetables every day  Include dark, leafy greens such as spinach, kale, mercedes greens, and mustard greens  Eat yellow and orange vegetables such as carrots, sweet potatoes, and winter squash  · Eat a variety of fruits every day  Choose fresh or canned fruit (canned in its own juice or light syrup) instead of juice  Fruit juice has very little or no fiber  · Eat low-fat dairy foods    Drink fat-free (skim) milk or 1% milk  Eat fat-free yogurt and low-fat cottage cheese  Try low-fat cheeses such as mozzarella and other reduced-fat cheeses  · Choose meat and other protein foods that are low in fat  Choose beans or other legumes such as split peas or lentils  Choose fish, skinless poultry (chicken or turkey), or lean cuts of red meat (beef or pork)  Before you cook meat or poultry, cut off any visible fat  · Use less fat and oil  Try baking foods instead of frying them  Add less fat, such as margarine, sour cream, regular salad dressing and mayonnaise to foods  Eat fewer high-fat foods  Some examples of high-fat foods include french fries, doughnuts, ice cream, and cakes  · Eat fewer sweets  Limit foods and drinks that are high in sugar  This includes candy, cookies, regular soda, and sweetened drinks  Ways to decrease calories:   · Eat smaller portions  ? Use a small plate with smaller servings  ? Do not eat second helpings  ? When you eat at a restaurant, ask for a box and place half of your meal in the box before you eat  ? Share an entrée with someone else  · Replace high-calorie snacks with healthy, low-calorie snacks  ? Choose fresh fruit, vegetables, fat-free rice cakes, or air-popped popcorn instead of potato chips, nuts, or chocolate  ? Choose water or calorie-free drinks instead of soda or sweetened drinks  · Do not shop for groceries when you are hungry  You may be more likely to make unhealthy food choices  Take a grocery list of healthy foods and shop after you have eaten  · Eat regular meals  Do not skip meals  Skipping meals can lead to overeating later in the day  This can make it harder for you to lose weight  Eat a healthy snack in place of a meal if you do not have time to eat a regular meal  Talk with a dietitian to help you create a meal plan and schedule that is right for you      Other things to consider as you try to lose weight:   · Be aware of situations that may give you the urge to overeat, such as eating while watching television  Find ways to avoid these situations  For example, read a book, go for a walk, or do crafts  · Meet with a weight loss support group or friends who are also trying to lose weight  This may help you stay motivated to continue working on your weight loss goals  © Copyright [address] Information is for End User's use only and may not be sold, redistributed or otherwise used for commercial purposes  All illustrations and images included in CareNotes® are the copyrighted property of [employer]  or [facility] [employer]   The above information is an  only  It is not intended as medical advice for individual conditions or treatments  Talk to your doctor, nurse or pharmacist before following any medical regimen to see if it is safe and effective for you  Low Fat Diet   AMBULATORY CARE:   A low-fat diet  is an eating plan that is low in total fat, unhealthy fat, and cholesterol  You may need to follow a low-fat diet if you have trouble digesting or absorbing fat  You may also need to follow this diet if you have high cholesterol  You can also lower your cholesterol by increasing the amount of fiber in your diet  Soluble fiber is a type of fiber that helps to decrease cholesterol levels  Different types of fat in food:   · Limit unhealthy fats  A diet that is high in cholesterol, saturated fat, and trans fat may cause unhealthy cholesterol levels  Unhealthy cholesterol levels increase your risk of heart disease  ? Cholesterol:  Limit intake of cholesterol to less than 200 mg per day  Cholesterol is found in meat, eggs, and dairy  ? Saturated fat:  Limit saturated fat to less than 7% of your total daily calories  Ask your dietitian how many calories you need each day  Saturated fat is found in butter, cheese, ice cream, whole milk, and palm oil   Saturated fat is also found in meat, such as beef, pork, chicken skin, and processed meats  Processed meats include sausage, hot dogs, and bologna  ? Trans fat:  Avoid trans fat as much as possible  Trans fat is used in fried and baked foods  Foods that say trans fat free on the label may still have up to 0 5 grams of trans fat per serving  · Include healthy fats  Replace foods that are high in saturated and trans fat with foods high in healthy fats  This may help to decrease high cholesterol levels  ? Monounsaturated fats: These are found in avocados, nuts, and vegetable oils, such as olive, canola, and sunflower oil  ? Polyunsaturated fats: These can be found in vegetable oils, such as soybean or corn oil  Omega-3 fats can help to decrease the risk of heart disease  Omega-3 fats are found in fish, such as salmon, herring, trout, and tuna  Omega-3 fats can also be found in plant foods, such as walnuts, flaxseed, soybeans, and canola oil  Foods to limit or avoid:   · Grains:      ? Snacks that are made with partially hydrogenated oils, such as chips, regular crackers, and butter-flavored popcorn    ? High-fat baked goods, such as biscuits, croissants, doughnuts, pies, cookies, and pastries    · Dairy:      ? Whole milk, 2% milk, and yogurt and ice cream made with whole milk    ? Half and half creamer, heavy cream, and whipping cream    ? Cheese, cream cheese, and sour cream    · Meats and proteins:      ? High-fat cuts of meat (T-bone steak, regular hamburger, and ribs)    ? Fried meat, poultry (turkey and chicken), and fish    ? Poultry (chicken and turkey) with skin    ? Cold cuts (salami or bologna), hot dogs, [place], and sausage    ? Whole eggs and egg yolks    · Vegetables and fruits with added fat:      ? Fried vegetables or vegetables in butter or high-fat sauces, such as cream or cheese sauces    ? Fried fruit or fruit served with butter or cream    · Fats:      ? Butter, stick margarine, and shortening    ?  Coconut, palm oil, and palm kernel oil    Foods to include:   · Grains:      ? Whole-grain breads, cereals, pasta, and brown rice    ? Low-fat crackers and pretzels    · Vegetables and fruits:      ? Fresh, frozen, or canned vegetables (no salt or low-sodium)    ? Fresh, frozen, dried, or canned fruit (canned in light syrup or fruit juice)    ? Avocado    · Low-fat dairy products:      ? Nonfat (skim) or 1% milk    ? Nonfat or low-fat cheese, yogurt, and cottage cheese    · Meats and proteins:      ? Chicken or turkey with no skin    ? Baked or broiled fish    ? Lean beef and pork (loin, round, extra lean hamburger)    ? Beans and peas, unsalted nuts, soy products    ? Egg whites and substitutes    ? Seeds and nuts    · Fats:      ? Unsaturated oil, such as canola, olive, peanut, soybean, or sunflower oil    ? Soft or liquid margarine and vegetable oil spread    ? Low-fat salad dressing    Other ways to decrease fat:   · Read food labels before you buy foods  Choose foods that have less than 30% of calories from fat  Choose low-fat or fat-free dairy products  Remember that fat free does not mean calorie free  These foods still contain calories, and too many calories can lead to weight gain  · Trim fat from meat and avoid fried food  Trim all visible fat from meat before you cook it  Remove the skin from poultry  Do not norman meat, fish, or poultry  Bake, roast, boil, or broil these foods instead  Avoid fried foods  Eat a baked potato instead of [place] [employer] fries  Steam vegetables instead of sautéing them in butter  · Add less fat to foods  Use imitation kingston bits on salads and baked potatoes instead of regular kingston bits  Use fat-free or low-fat salad dressings instead of regular dressings  Use low-fat or nonfat butter-flavored topping instead of regular butter or margarine on popcorn and other foods  Ways to decrease fat in recipes:  Replace high-fat ingredients with low-fat or nonfat ones   This may cause baked goods to be drier than usual  You may need to use nonfat cooking spray on pans to prevent food from sticking  You also may need to change the amount of other ingredients, such as water, in the recipe  Try the following:  · Use low-fat or light margarine instead of regular margarine or shortening  · Use lean ground turkey breast or chicken, or lean ground beef (less than 5% fat) instead of hamburger  · Add 1 teaspoon of canola oil to 8 ounces of skim milk instead of using cream or half and half  · Use grated zucchini, carrots, or apples in breads instead of coconut  · Use blenderized, low-fat cottage cheese, plain tofu, or low-fat ricotta cheese instead of cream cheese  · Use 1 egg white and 1 teaspoon of canola oil, or use ¼ cup (2 ounces) of fat-free egg substitute instead of a whole egg  · Replace half of the oil that is called for in a recipe with applesauce when you bake  Use 3 tablespoons of cocoa powder and 1 tablespoon of canola oil instead of a square of baking chocolate  How to increase fiber:  Eat enough high-fiber foods to get 20 to 30 grams of fiber every day  Slowly increase your fiber intake to avoid stomach cramps, gas, and other problems  · Eat 3 ounces of whole-grain foods each day  An ounce is about 1 slice of bread  Eat whole-grain breads, such as whole-wheat bread  Whole wheat, whole-wheat flour, or other whole grains should be listed as the first ingredient on the food label  Replace white flour with whole-grain flour or use half of each in recipes  Whole-grain flour is heavier than white flour, so you may have to add more yeast or baking powder  · Eat a high-fiber cereal for breakfast   Oatmeal is a good source of soluble fiber  Look for cereals that have bran or fiber in the name  Choose whole-grain products, such as brown rice, barley, and whole-wheat pasta  · Eat more beans, peas, and lentils  For example, add beans to soups or salads   Eat at least 5 cups of fruits and vegetables each day  Eat fruits and vegetables with the peel because the peel is high in fiber  © Copyright [address] Information is for End User's use only and may not be sold, redistributed or otherwise used for commercial purposes  All illustrations and images included in CareNotes® are the copyrighted property of [employer]  or [employer]   The above information is an  only  It is not intended as medical advice for individual conditions or treatments  Talk to your doctor, nurse or pharmacist before following any medical regimen to see if it is safe and effective for you  Heart Healthy Diet   AMBULATORY CARE:   A heart healthy diet  is an eating plan low in unhealthy fats and sodium (salt)  The plan is high in healthy fats and fiber  A heart healthy diet helps improve your cholesterol levels and lowers your risk for heart disease and stroke  A dietitian will teach you how to read and understand food labels  Heart healthy diet guidelines to follow:   · Choose foods that contain healthy fats  ? Unsaturated fats  include monounsaturated and polyunsaturated fats  Unsaturated fat is found in foods such as soybean, canola, olive, corn, and safflower oils  It is also found in soft tub margarine that is made with liquid vegetable oil  ? Omega-3 fat  is found in certain fish, such as salmon, tuna, and trout, and in walnuts and flaxseed  Eat fish high in omega-3 fats at least 2 times a week  · Get 20 to 30 grams of fiber each day  Fruits, vegetables, whole-grain foods, and legumes (cooked beans) are good sources of fiber  · Limit or do not have unhealthy fats  ? Cholesterol  is found in animal foods, such as eggs and lobster, and in dairy products made from whole milk  Limit cholesterol to less than 200 mg each day  ? Saturated fat  is found in meats, such as kingston and hamburger  It is also found in chicken or turkey skin, whole milk, and butter  Limit saturated fat to less than 7% of your total daily calories  ? Trans fat  is found in packaged foods, such as potato chips and cookies  It is also in hard margarine, some fried foods, and shortening  Do not eat foods that contain trans fats  · Limit sodium as directed  You may be told to limit sodium to 2,000 to 2,300 mg each day  Choose low-sodium or no-salt-added foods  Add little or no salt to food you prepare  Use herbs and spices in place of salt  Include the following in your heart healthy plan:  Ask your dietitian or healthcare provider how many servings to have from each of the following food groups:  · Grains:      ? Whole-wheat breads, cereals, and pastas, and brown rice    ? Low-fat, low-sodium crackers and chips    · Vegetables:      ? Broccoli, green beans, green peas, and spinach    ? Collards, kale, and lima beans    ? Carrots, sweet potatoes, tomatoes, and peppers    ? Canned vegetables with no salt added    · Fruits:      ? Bananas, peaches, pears, and pineapple    ? Grapes, raisins, and dates    ? Oranges, tangerines, grapefruit, orange juice, and grapefruit juice    ? Apricots, mangoes, melons, and papaya    ? Raspberries and strawberries    ? Canned fruit with no added sugar    · Low-fat dairy:      ? Nonfat (skim) milk, 1% milk, and low-fat almond, cashew, or soy milks fortified with calcium    ? Low-fat cheese, regular or frozen yogurt, and cottage cheese    · Meats and proteins:      ? Lean cuts of beef and pork (loin, leg, round), skinless chicken and turkey    ? Legumes, soy products, egg whites, or nuts    Limit or do not include the following in your heart healthy plan:   · Unhealthy fats and oils:      ? Whole or 2% milk, cream cheese, sour cream, or cheese    ? High-fat cuts of beef (T-bone steaks, ribs), chicken or turkey with skin, and organ meats such as liver    ?  Butter, stick margarine, shortening, and cooking oils such as coconut or palm oil    · Foods and liquids high in sodium:      ? Packaged foods, such as frozen dinners, cookies, macaroni and cheese, and cereals with more than 300 mg of sodium per serving    ? Vegetables with added sodium, such as instant potatoes, vegetables with added sauces, or regular canned vegetables    ? Cured or smoked meats, such as hot dogs, kingston, and sausage    ? High-sodium ketchup, barbecue sauce, salad dressing, pickles, olives, soy sauce, or miso    · Foods and liquids high in sugar:      ? Candy, cake, cookies, pies, or doughnuts    ? Soft drinks (soda), sports drinks, or sweetened tea    ? Canned or dry mixes for cakes, soups, sauces, or gravies    Other healthy heart guidelines:   · Do not smoke  Nicotine and other chemicals in cigarettes and cigars can cause lung and heart damage  Ask your healthcare provider for information if you currently smoke and need help to quit  E-cigarettes or smokeless tobacco still contain nicotine  Talk to your healthcare provider before you use these products  · Limit or do not drink alcohol as directed  Alcohol can damage your heart and raise your blood pressure  Your healthcare provider may give you specific daily and weekly limits  The general recommended limit is 1 drink a day for women 21 or older and for men 72 or older  Do not have more than 3 drinks in a day or 7 in a week  The recommended limit is 2 drinks a day for men 24to 59years of age  Do not have more than 4 drinks in a day or 14 in a week  A drink of alcohol is 12 ounces of beer, 5 ounces of wine, or 1½ ounces of liquor  · Exercise regularly  Exercise can help you maintain a healthy weight and improve your blood pressure and cholesterol levels  Regular exercise can also decrease your risk for heart problems  Ask your healthcare provider about the best exercise plan for you  Do not start an exercise program without asking your healthcare provider         Follow up with your doctor or cardiologist as directed:  Write down your questions so you remember to ask them during your visits  © Copyright [address] Information is for End User's use only and may not be sold, redistributed or otherwise used for commercial purposes  All illustrations and images included in CareNotes® are the copyrighted property of [employer]  or [employer]  The above information is an  only  It is not intended as medical advice for individual conditions or treatments  Talk to your doctor, nurse or pharmacist before following any medical regimen to see if it is safe and effective for you

## 2021-06-15 NOTE — PROGRESS NOTES
ADULT ANNUAL [facility]    NAME: Lissa Flores [id number]  AGE: 55 y o  SEX: male  : 1965     DATE: 6/15/2021     Assessment and Plan:     Problem List Items Addressed This Visit        Digestive    Esophageal reflux      Patient continues to take  Nexium for reflux            Respiratory    LEONIE on CPAP      He continues to use CPAP and is sleeping well            Other    Anxiety      He still has an anxiety issue and takes Zoloft with a rare Xanax           Other Visit Diagnoses     Annual physical exam    -  Primary    BMI 28 0-28 9,adult              Immunizations and preventive care screenings were discussed with patient today  Appropriate education was printed on patient's after visit summary  Counseling:  · Dental Health: discussed importance of regular tooth brushing, flossing, and dental visits  BMI Counseling: Body mass index is 28 35 kg/m²  The BMI is above normal  Nutrition recommendations include decreasing portion sizes, consuming healthier snacks and limiting drinks that contain sugar  Exercise recommendations include vigorous physical activity 75 minutes/week  Tobacco Cessation Counseling: The patient is sincerely urged to quit consumption of tobacco  He is not ready to quit tobacco        No follow-ups on file  Chief Complaint:     Chief Complaint   Patient presents with   Clark Tamayo physical / check up       History of Present Illness:     Adult Annual Physical   Patient here for a comprehensive physical exam  The patient reports problems - Anxiety  Diet and Physical Activity  · Diet/Nutrition: well balanced diet  · Exercise: no formal exercise        Depression Screening  PHQ-9 Depression Screening    PHQ-9:   Frequency of the following problems over the past two weeks:      Little interest or pleasure in doing things: 0 - not at all  Feeling down, depressed, or hopeless: 0 - not at all  Trouble falling or staying asleep, or sleeping too much: 0 - not at all  Feeling tired or having little energy: 0 - not at all  Poor appetite or overeatin - not at all  Feeling bad about yourself - or that you are a failure or have let yourself or your family down: 0 - not at all  Trouble concentrating on things, such as reading the newspaper or watching television: 0 - not at all  Moving or speaking so slowly that other people could have noticed  Or the opposite - being so fidgety or restless that you have been moving around a lot more than usual: 0 - not at all  Thoughts that you would be better off dead, or of hurting yourself in some way: 0 - not at all  PHQ-2 Score: 0  PHQ-9 Score: 0       General Health  · Sleep: sleeps well  · Hearing: normal - bilateral   · Vision: goes for regular eye exams  · Dental: no dental visits for >1 year   Health  · Symptoms include: none     Review of Systems:     Review of Systems   Constitutional: Negative for chills, fatigue, fever and unexpected weight change  HENT: Negative for congestion, ear pain, hearing loss, postnasal drip, sinus pressure, sore throat, trouble swallowing and voice change  Eyes: Negative for visual disturbance  Respiratory: Negative for cough, chest tightness, shortness of breath and wheezing  Cardiovascular: Negative for chest pain, palpitations and leg swelling  Gastrointestinal: Negative for abdominal distention, abdominal pain, anal bleeding, blood in stool, constipation, diarrhea and nausea  Endocrine: Negative for cold intolerance, polydipsia, polyphagia and polyuria  Genitourinary: Negative for dysuria, flank pain, frequency, hematuria and urgency  Musculoskeletal: Negative for arthralgias, back pain, gait problem, joint swelling, myalgias and neck pain  Skin: Negative for rash  Allergic/Immunologic: Negative for immunocompromised state     Neurological: Negative for dizziness, syncope, facial asymmetry, weakness, light-headedness, numbness and headaches  Hematological: Negative for adenopathy  Psychiatric/Behavioral: Negative for confusion, sleep disturbance and suicidal ideas  The patient is nervous/anxious  Past Medical History:     Past Medical History:   Diagnosis Date    GERD (gastroesophageal reflux disease)     LEONIE (obstructive sleep apnea)     Psychiatric disorder       Past Surgical History:     No past surgical history on file  Family History:     Family History   Family history unknown: Yes      Social History:     Social History     Socioeconomic History    Marital status: Single     Spouse name: None    Number of children: None    Years of education: None    Highest education level: None   Occupational History    None   Tobacco Use    Smoking status: Former Smoker     Quit date: 2000     Years since quittin 4    Smokeless tobacco: Current User     Types: Snuff   Substance and Sexual Activity    Alcohol use: Yes     Comment: holidays    Drug use: No    Sexual activity: Yes   Other Topics Concern    None   Social History Narrative    None     Social Determinants of Health     Financial Resource Strain:     Difficulty of Paying Living Expenses:    Food Insecurity:     Worried About Running Out of Food in the Last Year:     Ran Out of Food in the Last Year:    Transportation Needs:     Lack of Transportation (Medical):      Lack of Transportation (Non-Medical):    Physical Activity:     Days of Exercise per Week:     Minutes of Exercise per Session:    Stress:     Feeling of Stress :    Social Connections:     Frequency of Communication with Friends and Family:     Frequency of Social Gatherings with Friends and Family:     Attends Voodoo Services:     Active Member of Clubs or Organizations:     Attends Club or Organization Meetings:     Marital Status:    Intimate Partner Violence:     Fear of Current or Ex-Partner:     Emotionally Abused:     Physically Abused:     Sexually Abused:       Current Medications:     Current Outpatient Medications   Medication Sig Dispense Refill    ALPRAZolam (XANAX) 0 25 mg tablet Take 2 tablets (0 5 mg total) by mouth 2 (two) times a day as needed for anxiety 60 tablet 0    EPINEPHrine (EPIPEN) 0 3 mg/0 3 mL SOAJ Inject 0 3 mL (0 3 mg total) into a muscle once for 1 dose As directed 1 each 1    esomeprazole (NexIUM) 40 MG capsule Take 40 mg by mouth every morning before breakfast      sertraline (ZOLOFT) 100 mg tablet TAKE 1 TABLET BY MOUTH EVERY DAY 30 tablet 5     No current facility-administered medications for this visit  Allergies:     No Known Allergies   Physical Exam:     /78 (BP Location: Left arm, Patient Position: Sitting, Cuff Size: Standard)   Pulse 74   Temp 98 °F (36 7 °C)   Ht 5' 9" (1 753 m)   Wt 87 1 kg (192 lb)   SpO2 98%   BMI 28 35 kg/m²     Physical Exam  Vitals and nursing note reviewed  Constitutional:       Appearance: He is well-developed  HENT:      Head: Normocephalic and atraumatic  Eyes:      Extraocular Movements: Extraocular movements intact  Conjunctiva/sclera: Conjunctivae normal       Pupils: Pupils are equal, round, and reactive to light  Neck:      Vascular: No carotid bruit  Cardiovascular:      Rate and Rhythm: Normal rate and regular rhythm  Pulses: Normal pulses  Heart sounds: No murmur heard  Pulmonary:      Effort: Pulmonary effort is normal  No respiratory distress  Breath sounds: Normal breath sounds  Abdominal:      General: Abdomen is flat  Palpations: Abdomen is soft  Tenderness: There is no abdominal tenderness  Musculoskeletal:         General: Normal range of motion  Cervical back: Normal range of motion and neck supple  No rigidity  Lymphadenopathy:      Cervical: No cervical adenopathy  Skin:     General: Skin is warm and dry  Capillary Refill: Capillary refill takes less than 2 seconds  Neurological:      General: No focal deficit present  Mental Status: He is alert and oriented to person, place, and time  Cranial Nerves: No cranial nerve deficit  Coordination: Coordination normal       Gait: Gait normal    Psychiatric:         Behavior: Behavior normal          Thought Content: Thought content normal          Judgment: Judgment normal       Comments: Anxious          Kaitlin Witt MD  [facility]  BMI Counseling: Body mass index is 28 35 kg/m²  The BMI is above normal  Nutrition recommendations include reducing portion sizes

## 2022-02-04 DIAGNOSIS — F32.A DEPRESSION, UNSPECIFIED DEPRESSION TYPE: ICD-10-CM

## 2022-02-07 RX ORDER — SERTRALINE HYDROCHLORIDE 100 MG/1
TABLET, FILM COATED ORAL
Qty: 30 TABLET | Refills: 2 | Status: SHIPPED | OUTPATIENT
Start: 2022-02-07

## 2022-05-10 ENCOUNTER — OFFICE VISIT (OUTPATIENT)
Dept: URGENT CARE | Facility: CLINIC | Age: 57
End: 2022-05-10
Payer: COMMERCIAL

## 2022-05-10 VITALS
OXYGEN SATURATION: 96 % | DIASTOLIC BLOOD PRESSURE: 90 MMHG | HEART RATE: 95 BPM | RESPIRATION RATE: 18 BRPM | TEMPERATURE: 98.7 F | SYSTOLIC BLOOD PRESSURE: 128 MMHG

## 2022-05-10 DIAGNOSIS — R05.1 ACUTE COUGH: Primary | ICD-10-CM

## 2022-05-10 DIAGNOSIS — Z20.822 EXPOSURE TO COVID-19 VIRUS: ICD-10-CM

## 2022-05-10 PROCEDURE — 99213 OFFICE O/P EST LOW 20 MIN: CPT

## 2022-05-10 PROCEDURE — 87636 SARSCOV2 & INF A&B AMP PRB: CPT

## 2022-05-10 NOTE — PATIENT INSTRUCTIONS
Check my chart for COVID/flu results  Tylenol for fevers  Rest and hydrate  Quarantine at home for 5 days from start of symptoms, then wear a mask for an additional 5 days  Follow up with pcp in 3-5 days  Go to ER with any worsening symptoms  COVID-19 and Chronic Health Conditions   WHAT YOU NEED TO KNOW:   Your chronic condition may increase your risk for COVID-19 or serious problems it can cause  Healthcare providers might need to make changes that affect how you usually manage your chronic health condition  Providers may change hours of operation or not have patients come in to be seen  You may not be able to make appointments to get blood drawn or to have tests or procedures  This may continue until the virus that causes COVID-19 is controlled  Until then, you can take steps to manage your condition  The steps will also lower your risk for COVID-19 or the serious problems it causes  If you do develop COVID-19, healthcare providers will tell you when it is okay to be around others after you recover  This depends on your chronic condition, any symptoms of COVID-19 that developed, and how severe the symptoms were  DISCHARGE INSTRUCTIONS:   Call your local emergency number (911 in the [address] [place] [address],[address]) or an emergency department if:   · You have trouble breathing or shortness of breath  · You have chest pain or pressure that lasts longer than 5 minutes  · You become confused or hard to wake  · Your lips or face are blue  Return to the emergency department if:   · You have a fever of 104°F (40°C) or higher  Call your doctor or healthcare provider if:   · You have symptoms of COVID-19  · You have questions or concerns about COVID-19 or your chronic condition  What you need to know about serious problems from the virus:  You may develop long-term health problems caused by the virus  Your risk is higher if you are 65 or older   A weak immune system, obesity, diabetes, chronic kidney disease, or a heart or lung condition can also increase your risk  Your risk is also higher if you are a current or former cigarette smoker  COVID-19 can lead to any of the following:  · Multisymptom inflammatory syndrome in adults (MIS-A) or in children (MIS-C), causing inflammation in the heart, digestive system, skin, or brain    · Shortness of breath, serious lower respiratory conditions, such as pneumonia or acute respiratory distress syndrome (ARDS)    · Blood clots or blood vessel damage    · Organ damage from a lack of oxygen or from blood clots    · Sleep problems    · Problems thinking clearly, remembering information, or concentrating    · Mood changes, depression, or anxiety    · Long-term problems tasting or smelling    · Loss of appetite and weight loss    · Nerve pain    · Fatigue (feeling mentally and physically tired)    How the 2019 coronavirus spreads: The virus spreads quickly and easily  The virus can be passed starting 2 to 3 days before symptoms begin or before a positive test if symptoms never begin  · Droplets are the main way all coronaviruses spread  The virus travels in droplets that form when a person talks, sings, coughs, or sneezes  The droplets can also float in the air for minutes or hours  Infection happens when you breathe in the droplets or get them in your eyes or nose  Close personal contact with an infected person increases your risk for infection  This means being within 6 feet (2 meters) of the person for at least 15 minutes over 24 hours  · Person-to-person contact can spread the virus  For example, a person with the virus on his or her hands can spread it by shaking hands with someone  · The virus can stay on objects and surfaces for up to 3 days  You may become infected by touching the object or surface and then touching your eyes or mouth      Manage your chronic health condition during this time:  If you do not have a regular healthcare provider, experts recommend you contact a local [facility] or health department  The following can help you manage your condition and prevent COVID-19:  · Get emergency care for your condition if needed  Talk to your healthcare providers about symptoms of your chronic condition that need immediate care  Your providers can help you create a plan or add exacerbation management to your plan  The plan will include when to go to an emergency department and when to call your local emergency number  This will depend on where you live and the services that are available during this time  · Go to dialysis appointments as scheduled  It is important to stay on schedule  You will need to have enough food to be able to follow the emergency diet plan if you must miss a session  The emergency diet needs to be part of the management plan for your condition  · Reschedule any upcoming appointments as needed  Medical facilities may be closed until the coronavirus is better controlled  This means you may need to reschedule a surgery, procedure, or check-up appointment  If you cannot have a phone or video appointment, you will need to make a new appointment  Some providers may be scheduling appointments several months in advance  Some surgeries and procedures will happen as scheduled  This depends on the medical condition and the reason for the surgery or procedure  You may need to have extra testing for COVID-19 several days before  · Follow any regular management plan you use  Your healthcare provider will tell you if you need to make any changes to your regular management plan  For example, if you have asthma, continue to follow your asthma action plan  If you have diabetes, you may need to check your blood sugar level more often  Stress and illness can make blood sugar levels go up  You may need to adjust medicine such as insulin  If you have a heart condition or high blood pressure, you may need to check your blood pressure more often   Stress and illness can also raise your blood pressure  · Talk to your healthcare providers about your medicines  You may be able to get more than 1 month of medicine at a time  This will lower the number of times you need to go to a pharmacy to get your medicines  Make sure you have enough medicine if you have a condition that can lead to an emergency  Examples include asthma medicines, insulin, or an epinephrine pen  Check the expiration dates on the medicines you currently have  Ask for refills as soon as possible, if needed  If it is not time to refill prescriptions, you may be able to get an emergency supply of some medicines  Medicine plans vary, so ask your healthcare provider or pharmacist for options  · Have supplies available in your home  If possible, get extra supplies you use regularly  Examples include absorbent pads, syringes, and wound cleaning solutions  This will limit the number of trips out of your home to get supplies  · Know the signs and symptoms of COVID-19  Signs and symptoms usually start about 5 days after infection but can take 2 to 14 days  Signs and symptoms range from mild to severe  You may feel like you have the flu or a bad cold  Your chronic health condition may cause some of the same symptoms COVID-19 causes  This can make it hard to know if a symptom is from COVID-19 or your chronic condition  Keep a record of any new or worsening symptom you have  This is especially important if you have a condition that often causes shortness of breath  Your provider can tell you if you should be tested for COVID-19  Tell your healthcare provider if you think you were infected but develop signs or symptoms not listed below:    ? A cough    ? Shortness of breath or trouble breathing that may become severe    ? A fever of at least 100 4°F, or 38°C (may be lower in adults 65 or older)    ? Chills that might include shaking    ? Muscle pain, body aches, or a headache    ?  A sore throat    ? Suddenly not being able to taste or smell anything    ? Feeling very tired (fatigue)    ? Congestion (stuffy head and nose), or a runny nose    ? Diarrhea, nausea, or vomiting    What you can do to prevent having to go out of your home during this time:   · Ask your healthcare provider for other ways to have appointments  Some providers offer phone, video, or other types of appointments  · Have food, medicines, and other supplies delivered  Some pharmacies can send certain medicines to you through the mail  Grocery stores and restaurants may be able to deliver food and other items  If possible, have delivered items placed somewhere  Try not to have someone hand you an item  You will be so close to the person that the virus can spread between you  · Ask someone to get items you need  The person can get groceries, medicines, or other needed items for you  Choose a person who does not have signs or symptoms of COVID-19 or has tested negative for it  The person should not be waiting for test results  He or she should not have a condition that increases the risk for COVID-19 or serious problems it causes  What you need to know about COVID-19 vaccines: Your healthcare provider can give you more information about what to expect, depending on your chronic condition  You are considered fully vaccinated against COVID-19 two weeks after the final dose of any COVID-19 vaccine  Let your healthcare provider know when you have received the final dose of the vaccine  Make a copy of your vaccination card  Keep the original with you in case you need to show it  Keep the copy in a safe place  · COVID-19 vaccines are given as a shot in 1 or 2 doses  Vaccination is recommended for everyone 5 years or older  One 2-dose vaccine is fully approved for those 12 or older  This vaccine also has an emergency use authorization (EUA) for children 11to 13years old   No vaccine is currently available for children younger than 5 years  A booster (additional) dose is given to help the immune system continue to protect against severe COVID-19     ? A booster is recommended for all adults 18 or older  The booster can be a different brand of the COVID-19 vaccine than you originally received  The timing for the booster depends on the type of vaccine you received:    § 1-dose vaccine: The booster is given at least 2 months after you received the vaccine  § 2-dose vaccine: The booster is given at least 6 months after the second dose   ? A booster can be given to adolescents 12to 16years old  Only 1 COVID-19 vaccine has an EUA for adolescent boosters  The booster is given at least 6 months after the second dose of the original vaccine series  Continue social distancing and other measures, even after you get the vaccine  Although it is not common, you can become infected after you get the vaccine  You may also be able to pass the virus to others without knowing you are infected  After you get the vaccine, check local, national, and international travel rules  You may need to be tested before you travel  Some countries require proof of a negative test before you travel  You may also need to quarantine after you return  Lower your risk for COVID-19:   · Wash your hands often throughout the day  Use soap and water  Rub your soapy hands together, lacing your fingers, for at least 20 seconds  Rinse with warm, running water  Dry your hands with a clean towel or paper towel  Use hand  that contains alcohol if soap and water are not available  Teach children how to wash their hands and use hand   · Cover sneezes and coughs  Turn your face away and cover your mouth and nose with a tissue  Throw the tissue away  Use the bend of your arm if a tissue is not available  Then wash your hands with soap and water or use hand   Teach children how to cover a cough or sneeze      · Follow worldwide, national, and local social distancing guidelines  Keep at least 6 feet (2 meters) between you and others  · Wear a face covering (mask) around anyone who does not live in your home  Use a cloth covering with at least 2 layers  You can also create layers by putting a cloth covering over a disposable non-medical mask  Cover your mouth and your nose  · Try not to touch your face  If you get the virus on your hands, you can transfer it to your eyes, nose, or mouth and become infected  You can also transfer it to objects, surfaces, or people  · Clean and disinfect high-touch surfaces and objects in your home often  Use disinfecting wipes, or make a solution by mixing 4 teaspoons of bleach with 1 quart (4 cups) of water  Do not  use any cleaning or disinfecting products that can trigger an asthma attack or other breathing problems  Open windows or have circulating air as you clean  Do not  mix ammonia with bleach  This will create toxic fumes  How to follow social distancing guidelines:  National and local social distancing rules vary  Rules and restrictions may change over time as restrictions are lifted  The following are general guidelines:  · Stay home if you are sick or think you may have COVID-19  It is important to stay home if you are waiting for a testing appointment or for test results  · Avoid close physical contact with anyone who does not live in your home  Do not shake hands with, hug, or kiss a person as a greeting  If you must use public transportation (such as a bus or subway), try to sit or stand away from others  Wear your face covering  · Avoid in-person gatherings and crowds  Attend virtually if possible  Help strengthen your immune system:   · Ask about other vaccines you may need  Get the influenza (flu) vaccine as soon as recommended each year, usually starting in September or October  Get the pneumonia vaccine if recommended   Your healthcare provider can tell you if you should also get other vaccines, and when to get them  · Do not smoke  Nicotine and other chemicals in cigarettes and cigars can increase your risk for infection and for serious COVID-19 effects  Ask your healthcare provider for information if you currently smoke and need help to quit  E-cigarettes or smokeless tobacco still contain nicotine  Talk to your healthcare provider before you use these products  · Eat a variety of healthy foods  Examples include vegetables, fruits, whole-grain breads and cereals, lean meats and poultry, fish, low-fat dairy products, and cooked beans  Healthy foods contain nutrients that help keep your immune system strong  · Find ways to manage stress  You may be feeling more stressed than usual because of the COVID-19 outbreak  The situation is very stressful to many people  Talk to your healthcare providers about ways to manage stress during this time  Stress can lead to breathing problems or make the problems worse  Stress can trigger an attack or exacerbation of many health conditions  It is important to do things that help you feel more relaxed, such as the following:     ? Pick 1 or 2 times a day to watch the news  Constant news watching can increase your stress levels  ? Talk to a friend on the phone or through a video chat  ? Take a warm, soothing bath  ? Listen to music  ? Exercise can also help relieve stress  This may be hard if your regular gym or outdoor exercise area is closed  If you do not have exercise equipment at home, try walking inside your home  You can walk quickly or turn on music and dance  Follow up with your doctor or healthcare provider as directed: Your providers will tell you when you can come in for tests, procedures, or check-ups  Bring your symptom record with you to all appointments  Write down your questions so you remember to ask them during your visits    For more information:   · Centers for Disease Control and Prevention  [address] [place] , [address]  Phone: 8- 630 - 9551367  Phone: 8- 367 - 9104661  Web Address: [employer] com br    © Copyright [employer] 2022 Information is for End User's use only and may not be sold, redistributed or otherwise used for commercial purposes  All illustrations and images included in CareNotes® are the copyrighted property of [employer]  or [facility]   The above information is an  only  It is not intended as medical advice for individual conditions or treatments  Talk to your doctor, nurse or pharmacist before following any medical regimen to see if it is safe and effective for you

## 2022-05-10 NOTE — PROGRESS NOTES
[facility]        NAME: Anayeli Knight is a 65 y o  male  : 1965    MRN: [id number]  DATE: May 10, 2022  TIME: 6:16 PM    Assessment and Plan   Acute cough [R05 1]  1  Acute cough  Cov/Flu-Collected at [facility]   2  Exposure to COVID-19 virus       Wife tested positive for COVID on home test today  COVID/flu collected today  Await results  Patient Instructions     Check my chart for COVID/flu results  Tylenol for fevers  Rest and hydrate  Quarantine at home for 5 days from start of symptoms, then wear a mask for an additional 5 days  Follow up with PCP in 3-5 days  Proceed to  ER if symptoms worsen  Chief Complaint     Chief Complaint   Patient presents with    Sore Throat         History of Present Illness       Patient presents today with complaints of sinus congestion, sore throat, dry cough, pnd, loose stools x 2 days  He is here with his wife who tested positive for COVID today on a home test  He denies SOB, fevers, chills, dizziness  He may have had COVID in the past in 2021 when his wife tested positive for it, but he never took a test  He is vaccinated for COVID x 3  Last booster was   Review of Systems   Review of Systems   Constitutional: Negative for appetite change, chills and fever  HENT: Positive for congestion, postnasal drip, sinus pressure (related to allergies), sinus pain and sore throat  Negative for ear pain  Respiratory: Positive for cough (dry)  Negative for shortness of breath  Cardiovascular: Negative for chest pain and palpitations  Gastrointestinal: Positive for diarrhea (loose stools x 3 days  )  Negative for constipation and vomiting  Musculoskeletal: Negative for myalgias  Skin: Negative for rash  Allergic/Immunologic: Positive for environmental allergies  Neurological: Positive for headaches (related to sinuses)  Negative for dizziness and light-headedness     All other systems reviewed and are negative  Current Medications       Current Outpatient Medications:     ALPRAZolam (XANAX) 0 25 mg tablet, Take 2 tablets (0 5 mg total) by mouth 2 (two) times a day as needed for anxiety, Disp: 60 tablet, Rfl: 0    esomeprazole (NexIUM) 40 MG capsule, Take 40 mg by mouth every morning before breakfast, Disp: , Rfl:     sertraline (ZOLOFT) 100 mg tablet, TAKE 1 TABLET BY MOUTH EVERY DAY, Disp: 30 tablet, Rfl: 2    EPINEPHrine (EPIPEN) 0 3 mg/0 3 mL SOAJ, Inject 0 3 mL (0 3 mg total) into a muscle once for 1 dose As directed, Disp: 1 each, Rfl: 1    Current Allergies     Allergies as of 05/10/2022    (No Known Allergies)            The following portions of the patient's history were reviewed and updated as appropriate: allergies, current medications, past family history, past medical history, past social history, past surgical history and problem list      Past Medical History:   Diagnosis Date    GERD (gastroesophageal reflux disease)     LEONIE (obstructive sleep apnea)     Psychiatric disorder        History reviewed  No pertinent surgical history  Family History   Family history unknown: Yes         Medications have been verified  Objective   /90   Pulse 95   Temp 98 7 °F (37 1 °C)   Resp 18   SpO2 96%        Physical Exam     Physical Exam  Vitals and nursing note reviewed  Constitutional:       General: He is not in acute distress  Appearance: He is well-developed  He is not ill-appearing  HENT:      Head: Normocephalic  Right Ear: Tympanic membrane and ear canal normal  No tenderness  Left Ear: Tympanic membrane and ear canal normal  No tenderness  Nose: Congestion present  No rhinorrhea  Mouth/Throat:      Mouth: Mucous membranes are moist       Pharynx: Posterior oropharyngeal erythema present  No oropharyngeal exudate  Tonsils: No tonsillar exudate  0 on the right  0 on the left     Eyes:      Conjunctiva/sclera: Conjunctivae normal  Pupils: Pupils are equal, round, and reactive to light  Cardiovascular:      Rate and Rhythm: Normal rate and regular rhythm  Heart sounds: Normal heart sounds  No murmur heard  Pulmonary:      Effort: Pulmonary effort is normal  No respiratory distress  Breath sounds: Normal breath sounds  No stridor  No wheezing or rales  Abdominal:      General: Bowel sounds are normal       Tenderness: There is no abdominal tenderness  Musculoskeletal:         General: Normal range of motion  Cervical back: Normal range of motion  Lymphadenopathy:      Cervical: No cervical adenopathy  Skin:     General: Skin is warm  Neurological:      Mental Status: He is alert and oriented to person, place, and time     Psychiatric:         Mood and Affect: Mood normal          Behavior: Behavior normal

## 2022-05-11 LAB
FLUAV RNA RESP QL NAA+PROBE: NEGATIVE
FLUBV RNA RESP QL NAA+PROBE: NEGATIVE
SARS-COV-2 RNA RESP QL NAA+PROBE: NEGATIVE

## 2022-05-12 DIAGNOSIS — F41.9 ANXIETY: ICD-10-CM

## 2022-05-13 RX ORDER — ALPRAZOLAM 0.25 MG/1
0.5 TABLET ORAL 2 TIMES DAILY PRN
Qty: 60 TABLET | Refills: 0 | Status: SHIPPED | OUTPATIENT
Start: 2022-05-13

## 2022-05-13 NOTE — TELEPHONE ENCOUNTER
Patient Prescription Report        Patients      Select Patient Id Name D O B  R Coutada 106   [] 1 [facility] [id number]  MAIN [id number] [facility] [place]           Search Criteria    Name Date of Birth Date Range   Clover Dailey 58- [birth year]- To 6-     Requester Name Requested Date   Carlie Amin Fri May 13 2022 12:03:45 T-0400 Janice Bullock Daylight Time)     Summary   Prescriptions  1  Prescribers  1  Pharmacies  1  View Map    Drug Classes   Benzodiazepines  1  Stimulants  0  Opioids  0  Muscle Relaxants  0    Opioid Dosage   Total MME for Active Prescriptions  0    Average MME  0 00  MME Graph   MME Calculator       · Prescriptions  · Notifications    · Prescribers  · Pharmacies  · MME Graph      [] [place]   Drug Categories:      [] Benzodiazepines       Show  entries  Search:  Patient Id Prescription #  Filled Written Drug Label Qty Days Strength MME** Prescriber Pharmacy Payment REFILL #/Auth State Detail   1  [id number] 10/29/2020  10/29/2020 ALPRAZolam (Tablet)  60 0 15 0 25 MG CORY TIM  [facility], [facility] 00 / 0 [place]    Showing 1 to 1 of 1 entries  [id number]    · Prescriptions  · Notifications    · Prescribers  · Pharmacies  · MME Graph      [] [place]    Drug Categories:      [] Benzodiazepines       Show  entries  Search:     ALPRAZOLAM (Tablet)  15 days   Written: 10-  Filled: 10-    Qty: 60 0  Strength: 0 25 MG    NDC:  [id number]      Showing 1 to 1 of 1 entries  [id number]       * Per CDC guidance, the conversion factors and associated daily morphine milligram equivalents for drugs prescribed as part of medication-assisted treatment for opioid use disorder should not be used to benchmark against dosage thresholds meant for opioids prescribed for pain    Report Disclaimer:  Information from the [place] Prescription Drug Monitoring Program (PDMP) database is protected health information and any information accessed must be treated as confidential  Any person who knowingly or intentionally makes an unauthorized disclosure of information from the [address] will be subject to civil and criminal penalties as set forth in the ABC-MAP Act 2014-191, Act of Oct  27, 2014, P L  2911  The information in the [address] is submitted by pharmacies and may contain errors and omissions  Independent verification of prescription information with pharmacies and prescribers may sometimes be prudent or necessary        Educational content  [facility] MME calculation guidelines  [place] Prescribing Guidelines  Letter Regarding the Misapplication of Prescribing Guidelines   Guide for Appropriate Tapering or Discontinuation of Long-Term Opioid Use   #[id number]         Close Alerts

## 2022-07-13 ENCOUNTER — TELEMEDICINE (OUTPATIENT)
Dept: INTERNAL MEDICINE CLINIC | Age: 57
End: 2022-07-13
Payer: COMMERCIAL

## 2022-07-13 DIAGNOSIS — U07.1 COVID-19: Primary | ICD-10-CM

## 2022-07-13 PROCEDURE — 99441 PR PHYS/QHP TELEPHONE EVALUATION 5-10 MIN: CPT | Performed by: INTERNAL MEDICINE

## 2022-07-13 NOTE — PROGRESS NOTES
Virtual Brief Visit    Patient is located in the following state in which I hold an active license [place]    [id number] Outpatient Progress Note    Assessment/Plan:    Problem List Items Addressed This Visit    None        Disposition:     I recommended self-quarantine for 10 days and to watch for symptoms until 14 days after exposure  If patient were to develop symptoms, they should self isolate and call our office for further guidance  Patient is fully vaccinated and is not yet due for their booster shot  According to CDC guidelines, quarantine is not required after close contact exposure and they were advised to wear a mask for 10 days after the exposure  If patient were to develop symptoms, they should immediately self isolate and call our office for further guidance  Discussed symptom directed medication options with patient  Discussed risks, benefits, and side effects of inhaled corticosteroids and they agree to treatment  Patient meets criteria for PAXLOVID and they have been counseled appropriately according to EUA documentation released by the FDA  After discussion, patient agrees to treatment  Kaur Flick is an investigational medicine used to treat mild-to-moderate COVID-19 in adults and children (15years of age and older weighing at least 80 pounds (40 kg)) with positive results of direct SARS-CoV-2 viral testing, and who are at high risk for progression to severe COVID-19, including hospitalization or death  PAXLOVID is investigational because it is still being studied  There is limited information about the safety and effectiveness of using PAXLOVID to treat people with mild-to-moderate COVID-19      The FDA has authorized the emergency use of PAXLOVID for the treatment of mild-tomoderate COVID-19 in adults and children (15years of age and older weighing at least 80 pounds (40 kg)) with a positive test for the virus that causes COVID-19, and who are at high risk for progression to severe COVID-19, including hospitalization or death, under an EUA  What should I tell my healthcare provider before I take PAXLOVID? Tell your healthcare provider if you:  - Have any allergies  - Have liver or kidney disease  - Are pregnant or plan to become pregnant  - Are breastfeeding a child  - Have any serious illnesses    Tell your healthcare provider about all the medicines you take, including prescription and over-the-counter medicines, vitamins, and herbal supplements  Some medicines may interact with PAXLOVID and may cause serious side effects  Keep a list of your medicines to show your healthcare provider and pharmacist when you get a new medicine  You can ask your healthcare provider or pharmacist for a list of medicines that interact with PAXLOVID  Do not start taking a new medicine without telling your healthcare provider  Your healthcare provider can tell you if it is safe to take PAXLOVID with other medicines  Tell your healthcare provider if you are taking combined hormonal contraceptive  PAXLOVID may affect how your birth control pills work  Females who are able to become pregnant should use another effective alternative form of contraception or an additional barrier method of contraception  Talk to your healthcare provider if you have any questions about contraceptive methods that might be right for you  How do I take PAXLOVID? PAXLOVID consists of 2 medicines: nirmatrelvir and ritonavir  - Take 2 pink tablets of nirmatrelvir with 1 white tablet of ritonavir by mouth 2 times each day (in the morning and in the evening) for 5 days  For each dose, take all 3 tablets at the same time  - If you have kidney disease, talk to your healthcare provider  You may need a different dose  - Swallow the tablets whole  Do not chew, break, or crush the tablets  - Take PAXLOVID with or without food  - Do not stop taking PAXLOVID without talking to your healthcare provider, even if you feel better    - If you miss a dose of PAXLOVID within 8 hours of the time it is usually taken, take it as soon as you remember  If you miss a dose by more than 8 hours, skip the missed dose and take the next dose at your regular time  Do not take 2 doses of PAXLOVID at the same time  - If you take too much PAXLOVID, call your healthcare provider or go to the nearest hospital emergency room right away  - If you are taking a ritonavir- or cobicistat-containing medicine to treat hepatitis C or Human Immunodeficiency Virus (HIV), you should continue to take your medicine as prescribed by your healthcare provider   - Talk to your healthcare provider if you do not feel better or if you feel worse after 5 days  Who should generally not take PAXLOVID? Do not take PAXLOVID if:  You are allergic to nirmatrelvir, ritonavir, or any of the ingredients in PAXLOVID  You are taking any of the following medicines:  - Alfuzosin  - Pethidine, piroxicam, propoxyphene  - Ranolazine  - Amiodarone, dronedarone, flecainide, propafenone, quinidine  - Colchicine  - Lurasidone, pimozide, clozapine  - Dihydroergotamine, ergotamine, methylergonovine  - Lovastatin, simvastatin  - Sildenafil (Revatio®) for pulmonary arterial hypertension (PAH)  - Triazolam, oral midazolam  - Apalutamide  - Carbamazepine, phenobarbital, phenytoin  - Rifampin  - St  Royces Wort (hypericum perforatum)    What are the important possible side effects of PAXLOVID? Possible side effects of PAXLOVID are:  - Liver Problems  Tell your healthcare provider right away if you have any of these signs and symptoms of liver problems: loss of appetite, yellowing of your skin and the whites of eyes (jaundice), dark-colored urine, pale colored stools and itchy skin, stomach area (abdominal) pain  - Resistance to HIV Medicines  If you have untreated HIV infection, PAXLOVID may lead to some HIV medicines not working as well in the future    - Other possible side effects include: altered sense of taste, diarrhea, high blood pressure, or muscle aches    These are not all the possible side effects of PAXLOVID  Not many people have taken PAXLOVID  Serious and unexpected side effects may happen  [address] is still being studied, so it is possible that all of the risks are not known at this time  What other treatment choices are there? Like Tor Rather may allow for the emergency use of other medicines to treat people with COVID-19  Go to https://BeeTV/ for information on the emergency use of other medicines that are authorized by FDA to treat people with COVID-19  Your healthcare provider may talk with you about clinical trials for which you may be eligible  It is your choice to be treated or not to be treated with PAXLOVID  Should you decide not to receive it or for your child not to receive it, it will not change your standard medical care  What if I am pregnant or breastfeeding? There is no experience treating pregnant women or breastfeeding mothers with PAXLOVID  For a mother and unborn baby, the benefit of taking PAXLOVID may be greater than the risk from the treatment  If you are pregnant, discuss your options and specific situation with your healthcare provider  It is recommended that you use effective barrier contraception or do not have sexual activity while taking PAXLOVID  If you are breastfeeding, discuss your options and specific situation with your healthcare provider  How do I report side effects with PAXLOVID? Contact your healthcare provider if you have any side effects that bother you or do not go away  Report side effects to FDA MedWatch at www fda gov/medwatch or call 0-242-GNW3894 or you can report side effects to [facility]  at the contact information provided below  Website Fax number Telephone number   AppSheet [phone]     How should I store Larita Blizzard? Store PAXLOVID tablets at room temperature between 68°F to 77°F (20°C to 25°C)  Full fact sheet for patients, parents, and caregivers can be found at: deskwolfmitch deng    I have spent 15 minutes directly with the patient  Greater than 50% of this time was spent in counseling/coordination of care regarding: diagnostic results, prognosis, risks and benefits of treatment options, instructions for management, patient and family education, importance of treatment compliance and impressions  Encounter provider Ingrid Painting MD    Provider located at [address] [place] [address] [address] [facility] [id number] [phone]    Recent Visits  No visits were found meeting these conditions  Showing recent visits within past 7 days and meeting all other requirements  Future Appointments  No visits were found meeting these conditions  Showing future appointments within next 150 days and meeting all other requirements     This virtual check-in was done via telephone and he agrees to proceed  Patient agrees to participate in a virtual check in via telephone or video visit instead of presenting to the office to address urgent/immediate medical needs  Patient is aware this is a billable service  After connecting through Telephone, the patient was identified by name and date of birth  Roberto Rick was informed that this was a telemedicine visit and that the exam was being conducted confidentially over secure lines  Roberto Renemarina acknowledged consent and understanding of privacy and security of the telemedicine visit  I informed the patient that I have reviewed his record in Epic and presented the opportunity for him to ask any questions regarding the visit today  The patient agreed to participate      Verification of patient location:  Patient is located in the following [place] [facility] in which I hold an active license: [place]    Subjective:   Cecy May is a 65 y o  male who is concerned about COVID-19  Patient's symptoms include nasal congestion, sore throat and cough  Patient denies fever, chills, fatigue, shortness of breath, chest tightness, abdominal pain, nausea, diarrhea, myalgias and headaches  COVID-19 vaccination status: Fully vaccinated with booster    Exposure:   Contact with a person who is under investigation (PUI) for or who is positive for COVID-19 within the last 14 days?: No    Hospitalized recently for fever and/or lower respiratory symptoms?: No      Currently a healthcare worker that is involved in direct patient care?: No      Works in a special setting where the risk of COVID-19 transmission may be high? (this may include long-term care, correctional and [facility] facilities; homeless shelters; assisted-living facilities and group homes ): No      Resident in a special setting where the risk of COVID-19 transmission may be high? (this may include long-term care, correctional and [facility] facilities; homeless shelters; assisted-living facilities and group homes ): No      Lab Results   Component Value Date    SARSCOV2 Negative 05/10/2022     Past Medical History:   Diagnosis Date    GERD (gastroesophageal reflux disease)     LEONIE (obstructive sleep apnea)     Psychiatric disorder      No past surgical history on file  Current Outpatient Medications   Medication Sig Dispense Refill    ALPRAZolam (XANAX) 0 25 mg tablet TAKE 2 TABLETS (0 5 MG TOTAL) BY MOUTH 2 (TWO) TIMES A DAY AS NEEDED FOR ANXIETY 60 tablet 0    EPINEPHrine (EPIPEN) 0 3 mg/0 3 mL SOAJ Inject 0 3 mL (0 3 mg total) into a muscle once for 1 dose As directed 1 each 1    esomeprazole (NexIUM) 40 MG capsule Take 40 mg by mouth every morning before breakfast      sertraline (ZOLOFT) 100 mg tablet TAKE 1 TABLET BY MOUTH EVERY DAY 30 tablet 2     No current facility-administered medications for this visit  No Known Allergies    Review of Systems   Constitutional: Negative for chills, fatigue, fever and unexpected weight change  HENT: Positive for congestion and sore throat  Negative for ear pain, hearing loss, postnasal drip, sinus pressure, trouble swallowing and voice change  Eyes: Negative for visual disturbance  Respiratory: Positive for cough  Negative for chest tightness, shortness of breath and wheezing  Cardiovascular: Negative for chest pain, palpitations and leg swelling  Gastrointestinal: Negative for abdominal distention, abdominal pain, anal bleeding, blood in stool, constipation, diarrhea and nausea  Endocrine: Negative for cold intolerance, polydipsia, polyphagia and polyuria  Genitourinary: Negative for dysuria, flank pain, frequency, hematuria and urgency  Musculoskeletal: Negative for arthralgias, back pain, gait problem, joint swelling, myalgias and neck pain  Skin: Negative for rash  Allergic/Immunologic: Negative for immunocompromised state  Neurological: Negative for dizziness, syncope, facial asymmetry, weakness, light-headedness, numbness and headaches  Hematological: Negative for adenopathy  Psychiatric/Behavioral: Negative for confusion, sleep disturbance and suicidal ideas  Objective: There were no vitals filed for this visit  Physical Exam  HENT:      Nose: Congestion present  VIRTUAL VISIT DISCLAIMER    Erickson Fosterock verbally agrees to participate in [employer]  Pt is aware that [employer] could be limited without vital signs or the ability to perform a full hands-on physical exam  Roni Bernal understands he or the provider may request at any time to terminate the video visit and request the patient to seek care or treatment in person  Assessment/Plan:    Problem List Items Addressed This Visit    None         Recent Visits  No visits were found meeting these conditions    Showing recent visits within past 7 days and meeting all other requirements  Future Appointments  No visits were found meeting these conditions    Showing future appointments within next 150 days and meeting all other requirements         I spent 15 minutes directly with the patient during this visit

## 2022-09-21 DIAGNOSIS — F32.A DEPRESSION, UNSPECIFIED DEPRESSION TYPE: ICD-10-CM

## 2022-09-21 RX ORDER — SERTRALINE HYDROCHLORIDE 100 MG/1
100 TABLET, FILM COATED ORAL DAILY
Qty: 30 TABLET | Refills: 2 | Status: SHIPPED | OUTPATIENT
Start: 2022-09-21 | End: 2022-10-10 | Stop reason: SDUPTHER

## 2022-10-10 ENCOUNTER — TELEPHONE (OUTPATIENT)
Dept: INTERNAL MEDICINE CLINIC | Age: 57
End: 2022-10-10

## 2022-10-10 ENCOUNTER — OFFICE VISIT (OUTPATIENT)
Dept: INTERNAL MEDICINE CLINIC | Age: 57
End: 2022-10-10
Payer: COMMERCIAL

## 2022-10-10 VITALS
HEIGHT: 69 IN | TEMPERATURE: 98.2 F | WEIGHT: 198 LBS | HEART RATE: 82 BPM | BODY MASS INDEX: 29.33 KG/M2 | OXYGEN SATURATION: 98 % | SYSTOLIC BLOOD PRESSURE: 112 MMHG | DIASTOLIC BLOOD PRESSURE: 70 MMHG

## 2022-10-10 DIAGNOSIS — Z12.5 SCREENING FOR PROSTATE CANCER: ICD-10-CM

## 2022-10-10 DIAGNOSIS — F40.231 SEVERE NEEDLE PHOBIA: ICD-10-CM

## 2022-10-10 DIAGNOSIS — F32.A DEPRESSION, UNSPECIFIED DEPRESSION TYPE: ICD-10-CM

## 2022-10-10 DIAGNOSIS — Z12.5 SPECIAL SCREENING, PROSTATE CANCER: ICD-10-CM

## 2022-10-10 DIAGNOSIS — F41.9 ANXIETY: ICD-10-CM

## 2022-10-10 DIAGNOSIS — Z13.220 SCREENING, LIPID: ICD-10-CM

## 2022-10-10 DIAGNOSIS — G47.33 OSA ON CPAP: ICD-10-CM

## 2022-10-10 DIAGNOSIS — Z91.030 BEE STING ALLERGY: ICD-10-CM

## 2022-10-10 DIAGNOSIS — Z13.1 SCREENING FOR DIABETES MELLITUS (DM): ICD-10-CM

## 2022-10-10 DIAGNOSIS — Z99.89 OSA ON CPAP: ICD-10-CM

## 2022-10-10 DIAGNOSIS — K21.9 GASTROESOPHAGEAL REFLUX DISEASE, UNSPECIFIED WHETHER ESOPHAGITIS PRESENT: Primary | ICD-10-CM

## 2022-10-10 PROCEDURE — 99214 OFFICE O/P EST MOD 30 MIN: CPT | Performed by: INTERNAL MEDICINE

## 2022-10-10 RX ORDER — EPINEPHRINE 0.3 MG/.3ML
0.3 INJECTION SUBCUTANEOUS ONCE
Qty: 1 EACH | Refills: 1 | Status: SHIPPED | OUTPATIENT
Start: 2022-10-10 | End: 2022-10-10

## 2022-10-10 RX ORDER — SERTRALINE HYDROCHLORIDE 100 MG/1
100 TABLET, FILM COATED ORAL DAILY
Qty: 30 TABLET | Refills: 2 | Status: SHIPPED | OUTPATIENT
Start: 2022-10-10

## 2022-10-10 RX ORDER — ESOMEPRAZOLE MAGNESIUM 40 MG/1
40 CAPSULE, DELAYED RELEASE ORAL
Qty: 30 CAPSULE | Refills: 5 | Status: SHIPPED | OUTPATIENT
Start: 2022-10-10 | End: 2022-10-11

## 2022-10-10 NOTE — PATIENT INSTRUCTIONS
Anxiety   AMBULATORY CARE:   Anxiety  is a condition that causes you to feel extremely worried or nervous  The feelings are so strong that they can cause problems with your daily activities or sleep  Anxiety may be triggered by something you fear, or it may happen without a cause  Family or work stress, smoking, caffeine, and alcohol can increase your risk for anxiety  Certain medicines or health conditions can also increase your risk  Anxiety can become a long-term condition if it is not managed or treated  Common signs and symptoms that may occur with anxiety:   Fatigue or muscle tightness    Shaking, restlessness, or irritability    Problems focusing    Trouble sleeping    Feeling jumpy, easily startled, or dizzy    Rapid heartbeat or shortness of breath    Call your local emergency number (911 in the [address]) if:   You have chest pain, tightness, or heaviness that may spread to your shoulders, arms, jaw, neck, or back  You feel like hurting yourself or someone else  Call your doctor if:   Your symptoms get worse or do not get better with treatment  Your anxiety keeps you from doing your regular daily activities  You have new symptoms since your last visit  You have questions or concerns about your condition or care  Treatment for anxiety  may include medicines to help you feel calm and relaxed, and decrease your symptoms  Medicines are usually given together with therapy or other treatments  Manage anxiety:   Talk to someone about your anxiety  Your healthcare provider may suggest counseling  Cognitive behavioral therapy can help you understand and change how you react to events that trigger your symptoms  You might feel more comfortable talking with a friend or family member about your anxiety  Choose someone you know will be supportive and encouraging  Find ways to relax  Activities such as exercise, meditation, or listening to music can help you relax   Spend time with friends, or do things you enjoy  Practice deep breathing  Deep breathing can help you relax when you feel anxious  Focus on taking slow, deep breaths several times a day, or during an anxiety attack  Breathe in through your nose and out through your mouth  Create a regular sleep routine  Regular sleep can help you feel calmer during the day  Go to sleep and wake up at the same times every day  Do not watch television or use the computer right before bed  Your room should be comfortable, dark, and quiet  Eat a variety of healthy foods  Healthy foods include fruits, vegetables, low-fat dairy products, lean meats, fish, whole-grain breads, and cooked beans  Healthy foods can help you feel less anxious and have more energy  Exercise regularly  Exercise can increase your energy level  Exercise may also lift your mood and help you sleep better  Your healthcare provider can help you create an exercise plan  Do not smoke  Nicotine and other chemicals in cigarettes and cigars can increase anxiety  Ask your healthcare provider for information if you currently smoke and need help to quit  E-cigarettes or smokeless tobacco still contain nicotine  Talk to your healthcare provider before you use these products  Do not have caffeine  Caffeine can make your symptoms worse  Do not have foods or drinks that are meant to increase your energy level  Limit or do not drink alcohol  Ask your healthcare provider if alcohol is safe for you  You may not be able to drink alcohol if you take certain anxiety or depression medicines  Limit alcohol to 1 drink per day if you are a woman  Limit alcohol to 2 drinks per day if you are a man  A drink of alcohol is 12 ounces of beer, 5 ounces of wine, or 1½ ounces of liquor  Do not use drugs  Drugs can make your anxiety worse  It can also make anxiety hard to manage  Talk to your healthcare provider if you use drugs and want help to quit      Follow up with your doctor within 2 weeks or as directed:  Write down your questions so you remember to ask them during your visits  © Copyright [employer] 2022 Information is for End User's use only and may not be sold, redistributed or otherwise used for commercial purposes  All illustrations and images included in CareNotes® are the copyrighted property of A D A M [employer] Inc  or Marina Lu  The above information is an  only  It is not intended as medical advice for individual conditions or treatments  Talk to your doctor, nurse or pharmacist before following any medical regimen to see if it is safe and effective for you  GERD (Gastroesophageal Reflux Disease)   AMBULATORY CARE:   Gastroesophageal reflux disease (GERD)  is reflux that happens more than 2 times a week for a few weeks  Reflux means acid and food in your stomach back up into your esophagus  GERD can cause other health problems over time if it is not treated  Common causes of GERD:  GERD often happens because the lower muscle (sphincter) of the esophagus does not close properly  The sphincter normally opens to let food into the stomach  It then closes to keep food and stomach acid in the stomach  If the sphincter does not close properly, stomach acid and food back up (reflux) into the esophagus   The following may increase your risk for GERD:  Certain foods such as spicy foods, chocolate, foods that contain caffeine, peppermint, and fried foods    Hiatal hernia    Certain medicines such as calcium channel blockers (used to treat high blood pressure), allergy medicines, sedatives, or antidepressants    Pregnancy, obesity, or scleroderma    Lying down after a meal    Drinking alcohol or smoking cigarettes    Signs and symptoms:   Heartburn (burning pain in your chest)    Pain after meals that spreads to your neck, jaw, or shoulder    Pain that gets better when you change positions    Bitter or acid taste in your mouth    A dry cough    Trouble swallowing or pain with swallowing    Hoarseness or a sore throat    Burping or hiccups    Feeling full soon after you start eating    Call your local emergency number (911 in the [address]) if:   You have severe chest pain and sudden trouble breathing  Seek care immediately if:   You have trouble breathing after you vomit  You have trouble swallowing, or pain with swallowing  Your bowel movements are black, bloody, or tarry-looking  Your vomit looks like coffee grounds or has blood in it  Call your doctor or gastroenterologist if:   You feel full and cannot burp or vomit  You vomit large amounts, or you vomit often  You are losing weight without trying  Your symptoms get worse or do not improve with treatment  You have questions or concerns about your condition or care  Treatment for GERD:   Medicines  are used to decrease stomach acid  Medicine may also be used to help your lower esophageal sphincter and stomach contract (tighten) more  Surgery  is done to wrap the upper part of the stomach around the esophageal sphincter  This will strengthen the sphincter and prevent reflux  Manage GERD:       Do not have foods or drinks that may increase heartburn  These include chocolate, peppermint, fried or fatty foods, drinks that contain caffeine, or carbonated drinks (soda)  Other foods include spicy foods, onions, tomatoes, and tomato-based foods  Do not have foods or drinks that can irritate your esophagus, such as citrus fruits, juices, and alcohol  Do not eat large meals  When you eat a lot of food at one time, your stomach needs more acid to digest it  Eat 6 small meals each day instead of 3 large meals, and eat slowly  Do not eat meals 2 to 3 hours before bedtime  Elevate the head of your bed  Place 6-inch blocks under the head of your bed frame  You may also use more than one pillow under your head and shoulders while you sleep  Maintain a healthy weight    If you are overweight, weight loss may help relieve symptoms of GERD  Do not smoke  Smoking weakens the lower esophageal sphincter and increases the risk of GERD  Ask your healthcare provider for information if you currently smoke and need help to quit  E-cigarettes or smokeless tobacco still contain nicotine  Talk to your healthcare provider before you use these products  Do not put pressure on your abdomen  Pressure pushes acid up into your esophagus  Do not wear clothing that is tight around your waist  Do not bend over  Bend at the knees if you need to pick something up  Follow up with your doctor or gastroenterologist as directed:  Write down your questions so you remember to ask them during your visits  © [employer] 2022 Information is for End User's use only and may not be sold, redistributed or otherwise used for commercial purposes  All illustrations and images included in CareNotes® are the copyrighted property of [employer]  or Marina Lu  The above information is an  only  It is not intended as medical advice for individual conditions or treatments  Talk to your doctor, nurse or pharmacist before following any medical regimen to see if it is safe and effective for you

## 2022-10-10 NOTE — PROGRESS NOTES
Name: Maribel Luna      : 1965      MRN: [id number]  Encounter Provider: Elvira Jay MD  Encounter Date: 10/10/2022   Encounter department: [address] [place] [address]     1  Gastroesophageal reflux disease, unspecified whether esophagitis present  Assessment & Plan:  Increased reflux, but does not take Nexium daily    Orders:  -     Lipid panel; Future    2  Anxiety  Assessment & Plan:  Well controlled w/Zoloft  Minimal Xanax use      3  LEONIE on CPAP  Assessment & Plan:  Wears CPap      4  Bee sting allergy  -     EPINEPHrine (EPIPEN) 0 3 mg/0 3 mL SOAJ; Inject 0 3 mL (0 3 mg total) into a muscle once for 1 dose As directed    5  Depression, unspecified depression type  -     Comprehensive metabolic panel; Future  -     sertraline (ZOLOFT) 100 mg tablet; Take 1 tablet (100 mg total) by mouth daily    6  Special screening, prostate cancer  -     PSA, Total Screen; Future    7  Screening for prostate cancer    8  Screening for diabetes mellitus (DM)  -     Hemoglobin A1C; Future    9  Screening, lipid  -     Lipid panel; Future    BMI Counseling: Body mass index is 29 24 kg/m²  The BMI is above normal  Nutrition recommendations include decreasing portion sizes, encouraging healthy choices of fruits and vegetables, reducing intake of saturated and trans fat and reducing intake of cholesterol  Exercise recommendations include exercising 3-5 times per week  No pharmacotherapy was ordered  Rationale for BMI follow-up plan is due to patient being overweight or obese  Depression Screening and Follow-up Plan: Patient was screened for depression during today's encounter  They screened negative with a PHQ-2 score of 0  Tobacco Cessation Counseling: Tobacco cessation counseling was not provided  The patient is sincerely urged to quit consumption of tobacco  He is not ready to quit tobacco  Medication options and side effects of medication not discussed   Patient agreed to medication  Subjective      Anxiety  Presents for follow-up visit  Symptoms include compulsions, dry mouth, excessive worry, insomnia, irritability, nervous/anxious behavior and panic  Patient reports no chest pain, palpitations or shortness of breath  Symptoms occur occasionally  The severity of symptoms is causing significant distress and incapacitating  The quality of sleep is good  Nighttime awakenings: occasional      Compliance with medications is 51-75%  Side effects of treatment include GI discomfort  Review of Systems   Constitutional: Positive for irritability  Negative for chills and fever  HENT: Negative for ear pain and sore throat  Eyes: Negative for pain and visual disturbance  Respiratory: Negative for cough and shortness of breath  Cardiovascular: Negative for chest pain and palpitations  Gastrointestinal: Positive for diarrhea  Negative for abdominal pain and vomiting  Genitourinary: Negative for dysuria and hematuria  Musculoskeletal: Negative for arthralgias and back pain  Skin: Negative for color change and rash  Allergic/Immunologic: Negative  Neurological: Negative for seizures and syncope  Hematological: Negative  Psychiatric/Behavioral: The patient is nervous/anxious and has insomnia  All other systems reviewed and are negative        Current Outpatient Medications on File Prior to Visit   Medication Sig   • ALPRAZolam (XANAX) 0 25 mg tablet TAKE 2 TABLETS (0 5 MG TOTAL) BY MOUTH 2 (TWO) TIMES A DAY AS NEEDED FOR ANXIETY   • [DISCONTINUED] EPINEPHrine (EPIPEN) 0 3 mg/0 3 mL SOAJ Inject 0 3 mL (0 3 mg total) into a muscle once for 1 dose As directed   • [DISCONTINUED] esomeprazole (NexIUM) 40 MG capsule Take 40 mg by mouth every morning before breakfast   • [DISCONTINUED] sertraline (ZOLOFT) 100 mg tablet Take 1 tablet (100 mg total) by mouth daily       Objective     /70 (BP Location: Left arm, Patient Position: Sitting, Cuff Size: Standard) Pulse 82   Temp 98 2 °F (36 8 °C)   Ht 5' 9" (1 753 m)   Wt 89 8 kg (198 lb)   SpO2 98%   BMI 29 24 kg/m²     Physical Exam  Constitutional:       General: He is not in acute distress  Appearance: Normal appearance  He is well-developed  HENT:      Right Ear: External ear normal       Left Ear: External ear normal       Nose: Nose normal       Mouth/Throat:      Pharynx: No oropharyngeal exudate  Eyes:      Pupils: Pupils are equal, round, and reactive to light  Neck:      Thyroid: No thyromegaly  Vascular: No JVD  Cardiovascular:      Rate and Rhythm: Normal rate and regular rhythm  Pulses: Normal pulses  Heart sounds: Normal heart sounds  No murmur heard  No gallop  Pulmonary:      Effort: Pulmonary effort is normal  No respiratory distress  Breath sounds: Normal breath sounds  No wheezing or rales  Abdominal:      General: Bowel sounds are normal  There is no distension  Palpations: Abdomen is soft  There is no mass  Tenderness: There is no abdominal tenderness  Musculoskeletal:         General: No tenderness  Normal range of motion  Cervical back: Normal range of motion and neck supple  Lymphadenopathy:      Cervical: No cervical adenopathy  Skin:     General: Skin is warm and dry  Findings: No rash  Neurological:      Mental Status: He is alert and oriented to person, place, and time  Cranial Nerves: No cranial nerve deficit  Coordination: Coordination normal    Psychiatric:         Behavior: Behavior normal          Thought Content:  Thought content normal          Judgment: Judgment normal        Luis F Munguia MD

## 2022-10-10 NOTE — TELEPHONE ENCOUNTER
[facility] called said that Don's Esomeprazole 40mg is not covered by his insurance  They said his insurance will cover either Pantoprozole or omeprazole  If you want to switch his med to one of these, please call it into [facility] in [place]

## 2022-10-11 DIAGNOSIS — K21.9 GASTROESOPHAGEAL REFLUX DISEASE, UNSPECIFIED WHETHER ESOPHAGITIS PRESENT: Primary | ICD-10-CM

## 2022-10-11 RX ORDER — PANTOPRAZOLE SODIUM 20 MG/1
40 TABLET, DELAYED RELEASE ORAL
Qty: 30 TABLET | Refills: 5 | Status: SHIPPED | OUTPATIENT
Start: 2022-10-11

## 2022-11-04 ENCOUNTER — APPOINTMENT (OUTPATIENT)
Dept: LAB | Facility: CLINIC | Age: 57
End: 2022-11-04

## 2022-11-04 DIAGNOSIS — F32.A DEPRESSION, UNSPECIFIED DEPRESSION TYPE: ICD-10-CM

## 2022-11-04 DIAGNOSIS — Z13.220 SCREENING, LIPID: ICD-10-CM

## 2022-11-04 DIAGNOSIS — Z13.1 SCREENING FOR DIABETES MELLITUS (DM): ICD-10-CM

## 2022-11-04 DIAGNOSIS — Z12.5 SPECIAL SCREENING, PROSTATE CANCER: ICD-10-CM

## 2022-11-04 DIAGNOSIS — K21.9 GASTROESOPHAGEAL REFLUX DISEASE, UNSPECIFIED WHETHER ESOPHAGITIS PRESENT: ICD-10-CM

## 2022-11-04 LAB
ALBUMIN SERPL BCP-MCNC: 3.7 G/DL (ref 3.5–5)
ALP SERPL-CCNC: 65 U/L (ref 46–116)
ALT SERPL W P-5'-P-CCNC: 39 U/L (ref 12–78)
ANION GAP SERPL CALCULATED.3IONS-SCNC: 4 MMOL/L (ref 4–13)
AST SERPL W P-5'-P-CCNC: 16 U/L (ref 5–45)
BILIRUB SERPL-MCNC: 0.69 MG/DL (ref 0.2–1)
BUN SERPL-MCNC: 22 MG/DL (ref 5–25)
CALCIUM SERPL-MCNC: 9 MG/DL (ref 8.3–10.1)
CHLORIDE SERPL-SCNC: 104 MMOL/L (ref 96–108)
CHOLEST SERPL-MCNC: 194 MG/DL
CO2 SERPL-SCNC: 29 MMOL/L (ref 21–32)
CREAT SERPL-MCNC: 1.12 MG/DL (ref 0.6–1.3)
EST. AVERAGE GLUCOSE BLD GHB EST-MCNC: 117 MG/DL
GFR SERPL CREATININE-BSD FRML MDRD: 73 ML/MIN/1.73SQ M
GLUCOSE P FAST SERPL-MCNC: 106 MG/DL (ref 65–99)
HBA1C MFR BLD: 5.7 %
HDLC SERPL-MCNC: 61 MG/DL
LDLC SERPL CALC-MCNC: 114 MG/DL (ref 0–100)
NONHDLC SERPL-MCNC: 133 MG/DL
POTASSIUM SERPL-SCNC: 4 MMOL/L (ref 3.5–5.3)
PROT SERPL-MCNC: 7.3 G/DL (ref 6.4–8.4)
PSA SERPL-MCNC: 4.8 NG/ML (ref 0–4)
SODIUM SERPL-SCNC: 137 MMOL/L (ref 135–147)
TRIGL SERPL-MCNC: 95 MG/DL

## 2023-01-07 DIAGNOSIS — F32.A DEPRESSION, UNSPECIFIED DEPRESSION TYPE: ICD-10-CM

## 2023-01-08 ENCOUNTER — HOSPITAL ENCOUNTER (EMERGENCY)
Facility: HOSPITAL | Age: 58
Discharge: HOME | End: 2023-01-08
Attending: EMERGENCY MEDICINE

## 2023-01-08 ENCOUNTER — APPOINTMENT (EMERGENCY)
Dept: CT IMAGING | Facility: HOSPITAL | Age: 58
End: 2023-01-08

## 2023-01-08 VITALS
SYSTOLIC BLOOD PRESSURE: 159 MMHG | RESPIRATION RATE: 18 BRPM | HEART RATE: 89 BPM | DIASTOLIC BLOOD PRESSURE: 87 MMHG | TEMPERATURE: 99 F | OXYGEN SATURATION: 95 %

## 2023-01-08 DIAGNOSIS — M54.50 ACUTE LOW BACK PAIN: Primary | ICD-10-CM

## 2023-01-08 RX ORDER — METHOCARBAMOL 500 MG/1
500 TABLET, FILM COATED ORAL 2 TIMES DAILY
Qty: 10 TABLET | Refills: 0 | Status: SHIPPED | OUTPATIENT
Start: 2023-01-08

## 2023-01-08 RX ORDER — METHOCARBAMOL 500 MG/1
500 TABLET, FILM COATED ORAL ONCE
Status: COMPLETED | OUTPATIENT
Start: 2023-01-08 | End: 2023-01-08

## 2023-01-08 RX ADMIN — METHOCARBAMOL 500 MG: 500 TABLET ORAL at 18:15

## 2023-01-08 NOTE — ED PROVIDER NOTES
History  Chief Complaint   Patient presents with   • Back Pain     Patient reports bending over and overstretched back x several days ago  Patient is a 65year old male with past medical history of obstructive sleep apnea, GERD and back pain presented to the emergency department for evaluation of back pain  Reports he was walking on ice when he jerked his body and felt immediate pain in his low back on 12/29/22  Reports he has had constant pain since and has not been able to work secondary to the pain  Reports having worsening pain with bending and straining himself out  Reports he has been taking 6-8 Advil daily secondary to pain  Reports previous history of back pain but it typically resolves on its own quicker than what it has this time  Patient reports he is able to walk without difficulty  Denies bladder or bowel habit changes, bladder or bowel incontinence, or saddle anesthesia  Denies fevers, chills, rash, headache, weakness, dizziness, visual changes, abdominal pain, nausea, vomiting, diarrhea, constipation, chest pain, shortness of breath or difficulty breathing  Does not offer any other concerns or complaints  History provided by:  Patient and spouse   used: No    Back Pain  Location:  Lumbar spine  Quality:  Stiffness and aching  Radiates to:  Does not radiate  Pain is:  Same all the time  Duration:  10 days  Timing:  Constant  Progression:  Unchanged  Context comment:  Slipping on ice  Relieved by:  Being still  Worsened by:  Bending and standing  Ineffective treatments:  NSAIDs  Associated symptoms: no abdominal pain, no abdominal swelling, no bladder incontinence, no bowel incontinence, no chest pain, no dysuria, no fever, no headaches, no leg pain, no numbness, no paresthesias, no pelvic pain, no perianal numbness, no tingling, no weakness and no weight loss        Prior to Admission Medications   Prescriptions Last Dose Informant Patient Reported? Taking? ALPRAZolam (XANAX) 0 25 mg tablet   No No   Sig: TAKE 2 TABLETS (0 5 MG TOTAL) BY MOUTH 2 (TWO) TIMES A DAY AS NEEDED FOR ANXIETY   EPINEPHrine (EPIPEN) 0 3 mg/0 3 mL SOAJ   No No   Sig: Inject 0 3 mL (0 3 mg total) into a muscle once for 1 dose As directed   pantoprazole (PROTONIX) 20 mg tablet   No No   Sig: Take 2 tablets (40 mg total) by mouth daily before breakfast      Facility-Administered Medications: None       Past Medical History:   Diagnosis Date   • GERD (gastroesophageal reflux disease)    • LEONIE (obstructive sleep apnea)    • Psychiatric disorder        No past surgical history on file  Family History   Family history unknown: Yes     I have reviewed and agree with the history as documented  E-Cigarette/Vaping     E-Cigarette/Vaping Substances     Social History     Tobacco Use   • Smoking status: Former   • Smokeless tobacco: Current     Types: Snuff   Substance Use Topics   • Alcohol use: Yes     Alcohol/week: 1 0 standard drink     Types: 1 Cans of beer per week     Comment: holidays   • Drug use: No       Review of Systems   Constitutional: Negative for chills, fever and weight loss  HENT: Negative for ear pain and sore throat  Eyes: Negative for pain and visual disturbance  Respiratory: Negative for cough and shortness of breath  Cardiovascular: Negative for chest pain and palpitations  Gastrointestinal: Negative for abdominal pain, bowel incontinence, constipation, diarrhea, nausea and vomiting  Genitourinary: Negative for bladder incontinence, difficulty urinating, dysuria, hematuria, pelvic pain and urgency  Musculoskeletal: Positive for back pain  Negative for arthralgias, neck pain and neck stiffness  Skin: Negative for color change and rash  Neurological: Negative for tingling, seizures, syncope, weakness, numbness, headaches and paresthesias  All other systems reviewed and are negative  Physical Exam  Physical Exam  Vitals and nursing note reviewed  Constitutional:       General: He is not in acute distress  Appearance: Normal appearance  He is well-developed  He is not ill-appearing, toxic-appearing or diaphoretic  HENT:      Head: Normocephalic and atraumatic  Right Ear: External ear normal       Left Ear: External ear normal       Nose: Nose normal       Mouth/Throat:      Mouth: Mucous membranes are moist    Eyes:      General: No scleral icterus  Right eye: No discharge  Left eye: No discharge  Conjunctiva/sclera: Conjunctivae normal    Cardiovascular:      Rate and Rhythm: Normal rate and regular rhythm  Heart sounds: No murmur heard  Pulmonary:      Effort: Pulmonary effort is normal  No respiratory distress  Breath sounds: Normal breath sounds  Abdominal:      Palpations: Abdomen is soft  Tenderness: There is no abdominal tenderness  Musculoskeletal:         General: No swelling, deformity or signs of injury  Cervical back: Normal, normal range of motion and neck supple  No rigidity  Thoracic back: Normal       Lumbar back: Tenderness present  No swelling, edema, deformity, signs of trauma, lacerations or bony tenderness  Decreased range of motion  Negative right straight leg raise test and negative left straight leg raise test         Back:    Skin:     General: Skin is warm and dry  Capillary Refill: Capillary refill takes less than 2 seconds  Coloration: Skin is not jaundiced  Findings: No erythema or rash  Neurological:      General: No focal deficit present  Mental Status: He is alert and oriented to person, place, and time  Mental status is at baseline  Cranial Nerves: No cranial nerve deficit  Gait: Gait normal    Psychiatric:         Mood and Affect: Mood normal          Behavior: Behavior normal          Thought Content:  Thought content normal          Judgment: Judgment normal          Vital Signs  ED Triage Vitals [01/08/23 1630]   Temperature Pulse Respirations Blood Pressure SpO2   99 °F (37 2 °C) 89 18 159/87 95 %      Temp Source Heart Rate Source Patient Position - Orthostatic VS BP Location FiO2 (%)   Oral Monitor Sitting Left arm --      Pain Score       --           Vitals:    01/08/23 1630   BP: 159/87   Pulse: 89   Patient Position - Orthostatic VS: Sitting         Visual Acuity      ED Medications  Medications   methocarbamol (ROBAXIN) tablet 500 mg (500 mg Oral Given 1/8/23 1815)       Diagnostic Studies  Results Reviewed     None                 CT lumbar spine without contrast   Final Result by Lesley Portillo MD (01/08 1911)      No acute fractures identified  Degenerative changes of the lumbar spine as described above  Mild anterolisthesis L5-S1 secondary to unilateral left-sided pars interarticularis defect  Workstation performed: [id number]                    Procedures  Procedures         ED Course  ED Course as of 01/10/23 1048   Sun Jan 08, 2023   1845 12ml post void   1916 Pain is much better after medication                     Medical Decision Making    This is a 21-year-old male presenting to the emergency department for evaluation of low back pain after slipping on ice  Reports on 29 December he slipped and jerked his body on ice  Reports he did not fall, did not hit his head and did not lose consciousness  Reports he has had difficulty bending and straightening out secondary to pain  Reports he has been unable to work secondary to pain  Reports he is able to walk without difficulty but is slow in his movements  Patient denies bladder or bowel dysfunction, saddle anesthesia or incontinence  Patient is well-appearing with stable vital signs on initial examination      Differential diagnosis to include but is not limited to: Myofascial pain, diskogenic pain, radicular pain, muscle spasm, vertebral compression fracture, spinal stenosis, spondylosis, cancer, osteoporosis, OA, RA, consider but low suspicion of epidural abscess, cauda equina  Initial ED Plan: CT lumbar spine, Robaxin, bladder scan    ED results: Degenerative changes of the lumbar spine as described above  Mild anterolisthesis L5-S1 secondary to unilateral left-sided pars interarticularis defect  Bladder scan: 12 mL postvoid residual  -Patient reports pain relief after Robaxin  Final ED assessment: Patient is stable and well appearing  Discussed radiologic studies  Discussed follow-up with PCP and comprehensive spine  Amatory referral to comprehensive spine placed  Discussed Robaxin as needed  Discussed Tylenol/Motrin as needed  Strict return precautions were discussed including but not limited to worsening pain, bladder/bowel incontinence, saddle anesthesia, decreased range of motion, weakness  Patient verbalized understanding and is agreeable with the plan for discharge  Acute low back pain: acute illness or injury  Amount and/or Complexity of Data Reviewed  Radiology: ordered  Risk  Prescription drug management  Disposition  Final diagnoses:   Acute low back pain     Time reflects when diagnosis was documented in both MDM as applicable and the Disposition within this note     Time User Action Codes Description Comment    1/8/2023  7:19 PM Demetris Landaverde Add [M54 50] Acute low back pain       ED Disposition     ED Disposition   Discharge    Condition   Stable    Date/Time   Sun Jan 8, 2023  7:19 PM    Gerardo Elam 84 discharge to home/self care                 Follow-up Information     Follow up With Specialties Details Why Contact Info Additional Information    Verna Ragsdale MD Internal Medicine Call in 3 days For follow up [address] [place] [phone]       [facility] Emergency Department Emergency Medicine Go to  If symptoms worsen [phone] [facility] [id number] [facility] Emergency Department, [address], [place], [address]          Discharge Medication List as of 1/8/2023  7:21 PM      START taking these medications    Details   methocarbamol (ROBAXIN) 500 mg tablet Take 1 tablet (500 mg total) by mouth 2 (two) times a day, Starting Sun 1/8/2023, Normal         CONTINUE these medications which have NOT CHANGED    Details   pantoprazole (PROTONIX) 20 mg tablet Take 2 tablets (40 mg total) by mouth daily before breakfast, Starting Tue 10/11/2022, Normal      ALPRAZolam (XANAX) 0 25 mg tablet TAKE 2 TABLETS (0 5 MG TOTAL) BY MOUTH 2 (TWO) TIMES A DAY AS NEEDED FOR ANXIETY, Starting Fri 5/13/2022, Normal      EPINEPHrine (EPIPEN) 0 3 mg/0 3 mL SOAJ Inject 0 3 mL (0 3 mg total) into a muscle once for 1 dose As directed, Starting Mon 10/10/2022, Normal      sertraline (ZOLOFT) 100 mg tablet Take 1 tablet (100 mg total) by mouth daily, Starting Mon 10/10/2022, Normal                 PDMP Review     None          ED Provider  Electronically Signed by           Yoan Faustin PA-C  01/10/23 7205

## 2023-01-09 DIAGNOSIS — R97.20 ELEVATED PSA: Primary | ICD-10-CM

## 2023-01-09 RX ORDER — SERTRALINE HYDROCHLORIDE 100 MG/1
TABLET, FILM COATED ORAL
Qty: 30 TABLET | Refills: 2 | Status: SHIPPED | OUTPATIENT
Start: 2023-01-09

## 2023-01-09 NOTE — DISCHARGE INSTRUCTIONS
Follow up with PCP  Follow up with comprehensive spine  Robaxin as needed  Return to the ED with new or worsening symptoms including but not limited to pain, decreased range of motion, incontinence, saddle anesthesia

## 2023-01-11 ENCOUNTER — TELEPHONE (OUTPATIENT)
Dept: PHYSICAL THERAPY | Facility: OTHER | Age: 58
End: 2023-01-11

## 2023-01-11 DIAGNOSIS — M54.50 ACUTE LEFT-SIDED LOW BACK PAIN, UNSPECIFIED WHETHER SCIATICA PRESENT: Primary | ICD-10-CM

## 2023-01-11 NOTE — TELEPHONE ENCOUNTER
Nurse reached out to discuss recent referral entered for  Com[facility] Sp[facility] program   This RN briefly reviewed the triage and referral process with him  Patient stated, "This is a workers comp claim"  Nurse thanked patient for informing her and proceeded to explain [facility] WC protocol  Nurse discussed Workers Comp protocol with the patient and informed he must contact/reach out to employer and [address] [place] [address] to assist in moving forward with treatment  Nurse asked if patient reviewed/discussed WC status at ED visit on 1/8/23  Patient unsure and he "have not really talked with anyone yet"  Provided patient with contact info for [facility] and referral entered  Nurse encouraged pt to f/u  He was informed by his employer that his [employer] insurance does work with [facility]  Patient very appreciative of call and discussion      Referral closed per protocol

## 2023-01-13 ENCOUNTER — TELEPHONE (OUTPATIENT)
Dept: URGENT CARE | Facility: MEDICAL CENTER | Age: 58
End: 2023-01-13

## 2023-03-10 NOTE — PROGRESS NOTES
3/14/2023      Chief Complaint   Patient presents with   • Elevated PSA   • Follow-up     Assessment and Plan    65 y o  male new patient     1  Elevated PSA  - PSA from 11/4/22 was 4 8  Previously PSA in 2020 was 0 9  - KENDALL unremarkable  - Obtain repeat PSA   - Should PSA remain elevated, recommend mpMRI for further evaluation and should this be positive for concerning lesion, recommend MRI fusion prostate biopsy   - Will monitor and call with PSA results  History of Present Illness  Lillian Valentin is a 65 y o  male here for new patient evaluation of elevated PSA  PSA from November of 2022 was 4 8  Previously PSA was 0 9 in 2020  He denies ever having elevated PSA previously or prior biopsy  He denies any urinary complaints  Denies any prior  surgical manipulation  Denies any family history of  malignancy  Prior urologic history includes kidney stone with spontaneous expulsion 15-20 years ago  No kidney stone episodes since that time  No flank pain currently  Review of Systems   Constitutional: Negative for chills and fever  Respiratory: Negative for shortness of breath  Cardiovascular: Negative for chest pain  Gastrointestinal: Negative for abdominal pain  Genitourinary: Negative for difficulty urinating, dysuria, flank pain, frequency, hematuria and urgency  Neurological: Negative for dizziness  Past Medical History  Past Medical History:   Diagnosis Date   • GERD (gastroesophageal reflux disease)    • LEONIE (obstructive sleep apnea)    • Psychiatric disorder        Past Social History  History reviewed  No pertinent surgical history    Social History     Tobacco Use   Smoking Status Former   • Types: Cigarettes   • Passive exposure: Past   Smokeless Tobacco Current   • Types: Snuff, Chew       Past Family History  Family History   Problem Relation Age of Onset   • Lung cancer Father    • Colon cancer Mother    • Cri-du-chat syndrome Brother        Past Social history  Social History     Socioeconomic History   • Marital status: Single     Spouse name: Not on file   • Number of children: Not on file   • Years of education: Not on file   • Highest education level: Not on file   Occupational History   • Not on file   Tobacco Use   • Smoking status: Former     Types: Cigarettes     Passive exposure: Past   • Smokeless tobacco: Current     Types: Snuff, Chew   Vaping Use   • Vaping Use: Never used   Substance and Sexual Activity   • Alcohol use: Yes     Alcohol/week: 1 0 standard drink     Types: 1 Cans of beer per week     Comment: holidays   • Drug use: No   • Sexual activity: Not Currently   Other Topics Concern   • Not on file   Social History Narrative   • Not on file     Social Determinants of Health     Financial Resource Strain: Not on file   Food Insecurity: Not on file   Transportation Needs: Not on file   Physical Activity: Not on file   Stress: Not on file   Social Connections: Not on file   Intimate Partner Violence: Not on file   Housing Stability: Not on file       Current Medications  Current Outpatient Medications   Medication Sig Dispense Refill   • ALPRAZolam (XANAX) 0 25 mg tablet TAKE 2 TABLETS (0 5 MG TOTAL) BY MOUTH 2 (TWO) TIMES A DAY AS NEEDED FOR ANXIETY 60 tablet 0   • EPINEPHrine (EPIPEN) 0 3 mg/0 3 mL SOAJ Inject 0 3 mL (0 3 mg total) into a muscle once for 1 dose As directed 1 each 1   • pantoprazole (PROTONIX) 20 mg tablet Take 2 tablets (40 mg total) by mouth daily before breakfast (Patient not taking: Reported on 3/14/2023) 30 tablet 5   • sertraline (ZOLOFT) 100 mg tablet take 1 tablet by mouth daily 30 tablet 2   • methocarbamol (ROBAXIN) 500 mg tablet Take 1 tablet (500 mg total) by mouth 2 (two) times a day (Patient not taking: Reported on 3/14/2023) 10 tablet 0     No current facility-administered medications for this visit         Allergies  Allergies   Allergen Reactions   • White Faced Hornet Venom Hives         The following portions of the patient's history were reviewed and updated as appropriate: allergies, current medications, past medical history, past social history, past surgical history and problem list       Vitals  Vitals:    03/14/23 0853   BP: 138/86   Pulse: 78   SpO2: 99%   Weight: 89 8 kg (198 lb)   Height: 5' 9" (1 753 m)           Physical Exam  Physical Exam  Constitutional:       Appearance: Normal appearance  HENT:      Head: Normocephalic and atraumatic  Right Ear: External ear normal       Left Ear: External ear normal       Nose: Nose normal    Eyes:      General: No scleral icterus  Conjunctiva/sclera: Conjunctivae normal    Cardiovascular:      Pulses: Normal pulses  Pulmonary:      Effort: Pulmonary effort is normal    Genitourinary:     Comments: Prostate approx 25-30 g without nodules or tenderness  Musculoskeletal:         General: Normal range of motion  Cervical back: Normal range of motion  Skin:     General: Skin is warm and dry  Neurological:      General: No focal deficit present  Mental Status: He is alert and oriented to person, place, and time  Psychiatric:         Mood and Affect: Mood normal          Behavior: Behavior normal          Thought Content: Thought content normal          Judgment: Judgment normal            Results  No results found for this or any previous visit (from the past 1 hour(s)) ]  Lab Results   Component Value Date    PSA 4 8 (H) 11/04/2022    PSA 0 9 12/04/2020     Lab Results   Component Value Date    CALCIUM 9 0 11/04/2022    K 4 0 11/04/2022    CO2 29 11/04/2022     11/04/2022    BUN 22 11/04/2022    CREATININE 1 12 11/04/2022     No results found for: WBC, HGB, HCT, MCV, PLT        Orders  No orders of the defined types were placed in this encounter        Daryl Sprague

## 2023-03-14 ENCOUNTER — OFFICE VISIT (OUTPATIENT)
Dept: UROLOGY | Facility: CLINIC | Age: 58
End: 2023-03-14

## 2023-03-14 VITALS
BODY MASS INDEX: 29.33 KG/M2 | OXYGEN SATURATION: 99 % | HEART RATE: 78 BPM | WEIGHT: 198 LBS | DIASTOLIC BLOOD PRESSURE: 86 MMHG | HEIGHT: 69 IN | SYSTOLIC BLOOD PRESSURE: 138 MMHG

## 2023-03-14 DIAGNOSIS — R97.20 ELEVATED PSA: ICD-10-CM

## 2023-03-27 ENCOUNTER — OFFICE VISIT (OUTPATIENT)
Dept: FAMILY MEDICINE CLINIC | Facility: CLINIC | Age: 58
End: 2023-03-27

## 2023-03-27 VITALS
HEIGHT: 69 IN | HEART RATE: 82 BPM | BODY MASS INDEX: 28.73 KG/M2 | SYSTOLIC BLOOD PRESSURE: 136 MMHG | WEIGHT: 194 LBS | DIASTOLIC BLOOD PRESSURE: 78 MMHG | TEMPERATURE: 97.6 F | OXYGEN SATURATION: 97 %

## 2023-03-27 DIAGNOSIS — R09.81 NASAL CONGESTION: ICD-10-CM

## 2023-03-27 DIAGNOSIS — Z00.00 ANNUAL PHYSICAL EXAM: Primary | ICD-10-CM

## 2023-03-27 DIAGNOSIS — R73.03 PREDIABETES: ICD-10-CM

## 2023-03-27 DIAGNOSIS — E78.5 DYSLIPIDEMIA (HIGH LDL; LOW HDL): ICD-10-CM

## 2023-03-27 RX ORDER — FLUTICASONE PROPIONATE 50 MCG
1 SPRAY, SUSPENSION (ML) NASAL DAILY
Qty: 9.9 ML | Refills: 0 | Status: SHIPPED | OUTPATIENT
Start: 2023-03-27

## 2023-03-27 NOTE — PROGRESS NOTES
[address] [place] [address]    NAME: Manette Kehr VGEMOLJIG  AGE: 65 y o  SEX: male  : 1965     DATE: 3/27/2023     Assessment and Plan:     Problem List Items Addressed This Visit        Other    Annual physical exam - Primary   Other Visit Diagnoses     Nasal congestion        Relevant Medications    fluticasone (FLONASE) 50 mcg/act nasal spray      Is complete PSA at this time, most recent PSA on 2022 is abnormally elevated    Repeat hemoglobin A1c at this time in setting of prediabetes, repeat lipid panel in roughly 6 months    Has nasal congestion may be secondary to dust and pollen/allergies, may trial short course of Flonase to see exactly improvement, do not use more than 2 weeks at a time    Immunizations and preventive care screenings were discussed with patient today  Appropriate education was printed on patient's after visit summary  Discussed risks and benefits of prostate cancer screening  We discussed the controversial history of PSA screening for prostate cancer in the [place] as well as the risk of over detection and over treatment of prostate cancer by way of PSA screening  The patient understands that PSA blood testing is an imperfect way to screen for prostate cancer and that elevated PSA levels in the blood may also be caused by infection, inflammation, prostatic trauma or manipulation, urological procedures, or by benign prostatic enlargement  The role of the digital rectal examination in prostate cancer screening was also discussed and I discussed with him that there is large interobserver variability in the findings of digital rectal examination  Counseling:  Alcohol/drug use: discussed moderation in alcohol intake, the recommendations for healthy alcohol use, and avoidance of illicit drug use  Dental Health: discussed importance of regular tooth brushing, flossing, and dental visits    Injury prevention: discussed safety/seat belts, safety helmets, smoke detectors, carbon dioxide detectors, and smoking near bedding or upholstery  Sexual health: discussed sexually transmitted diseases, partner selection, use of condoms, avoidance of unintended pregnancy, and contraceptive alternatives  · Exercise: the importance of regular exercise/physical activity was discussed  Recommend exercise 3-5 times per week for at least 30 minutes  Depression Screening and Follow-up Plan: Patient was screened for depression during today's encounter  They screened negative with a PHQ-2 score of 2  No follow-ups on file  Chief Complaint:     Chief Complaint   Patient presents with   • Annual Exam     Annual Exam       History of Present Illness:     Adult Annual Physical   Patient here for a comprehensive physical exam  The patient reports no problems  Pt feels like he always get slightly winded, wife also had COVID-19  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  Diet and Physical Activity  · Diet/Nutrition: limited fruits/vegetables  Usually eats out  Occasional junk food  · 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[employer])  Depression Screening  PHQ-2/9 Depression Screening    Little interest or pleasure in doing things: 1 - several days  Feeling down, depressed, or hopeless: 1 - several days  PHQ-2 Score: 2  PHQ-2 Interpretation: Negative depression screen       General Health  · Sleep: sleeps well  Uses CPAP   · Hearing: decreased - bilateral   · Vision: no vision problems and wears glasses  · Dental: regular dental visits and brushes teeth twice daily   Health  · Symptoms include: none     Review of Systems:     Review of Systems   Constitutional: Negative for chills and fever  HENT: Positive for sinus pressure  Negative for congestion, rhinorrhea and sore throat  Respiratory: Negative for chest tightness and shortness of breath      Cardiovascular: Negative for chest pain  Gastrointestinal: Negative for abdominal pain  Musculoskeletal: Negative for arthralgias and back pain  Allergic/Immunologic: Positive for environmental allergies (dust and dirty)  Neurological: Negative for headaches  Psychiatric/Behavioral: Negative for sleep disturbance  Past Medical History:     Past Medical History:   Diagnosis Date   • GERD (gastroesophageal reflux disease)    • LEONIE (obstructive sleep apnea)    • Psychiatric disorder       Past Surgical History:     No past surgical history on file  Family History:     Family History   Problem Relation Age of Onset   • Lung cancer Father    • Colon cancer Mother    • Cri-du-chat syndrome Brother       Social History:     Social History     Socioeconomic History   • Marital status: Single     Spouse name: None   • Number of children: None   • Years of education: None   • Highest education level: None   Occupational History   • None   Tobacco Use   • Smoking status: Former     Types: Cigarettes     Passive exposure: Past   • Smokeless tobacco: Current     Types: Snuff, Chew   Vaping Use   • Vaping Use: Never used   Substance and Sexual Activity   • Alcohol use:  Yes     Alcohol/week: 1 0 standard drink     Types: 1 Cans of beer per week     Comment: holidays   • Drug use: No   • Sexual activity: Not Currently   Other Topics Concern   • None   Social History Narrative   • None     Social Determinants of Health     Financial Resource Strain: Not on file   Food Insecurity: Not on file   Transportation Needs: Not on file   Physical Activity: Not on file   Stress: Not on file   Social Connections: Not on file   Intimate Partner Violence: Not on file   Housing Stability: Not on file      Current Medications:     Current Outpatient Medications   Medication Sig Dispense Refill   • ALPRAZolam (XANAX) 0 25 mg tablet TAKE 2 TABLETS (0 5 MG TOTAL) BY MOUTH 2 (TWO) TIMES A DAY AS NEEDED FOR ANXIETY 60 tablet 0   • fluticasone (FLONASE) 50 mcg/act "nasal spray 1 spray into each nostril daily 9 9 mL 0   • methocarbamol (ROBAXIN) 500 mg tablet Take 1 tablet (500 mg total) by mouth 2 (two) times a day 10 tablet 0   • pantoprazole (PROTONIX) 20 mg tablet Take 2 tablets (40 mg total) by mouth daily before breakfast 30 tablet 5   • sertraline (ZOLOFT) 100 mg tablet take 1 tablet by mouth daily 30 tablet 2   • EPINEPHrine (EPIPEN) 0 3 mg/0 3 mL SOAJ Inject 0 3 mL (0 3 mg total) into a muscle once for 1 dose As directed 1 each 1     No current facility-administered medications for this visit  Allergies: Allergies   Allergen Reactions   • White Faced Hornet Venom Hives      Physical Exam:     /78 (BP Location: Left arm, Patient Position: Sitting, Cuff Size: Standard)   Pulse 82   Temp 97 6 °F (36 4 °C)   Ht 5' 9\" (1 753 m)   Wt 88 kg (194 lb)   SpO2 97%   BMI 28 65 kg/m²     Physical Exam  Vitals reviewed  Constitutional:       General: He is not in acute distress  Appearance: Normal appearance  He is not ill-appearing, toxic-appearing or diaphoretic  HENT:      Nose: Congestion present  Cardiovascular:      Rate and Rhythm: Normal rate and regular rhythm  Pulses: Normal pulses  Heart sounds: Normal heart sounds  No murmur heard  Pulmonary:      Effort: Pulmonary effort is normal       Breath sounds: Normal breath sounds  Abdominal:      General: Abdomen is flat  Musculoskeletal:         General: No swelling  Normal range of motion  Skin:     General: Skin is warm and dry  Capillary Refill: Capillary refill takes less than 2 seconds  Coloration: Skin is not jaundiced  Neurological:      General: No focal deficit present  Mental Status: He is alert     Psychiatric:         Mood and Affect: Mood normal             MD Syd Nath  "

## 2023-04-24 ENCOUNTER — APPOINTMENT (OUTPATIENT)
Dept: LAB | Facility: CLINIC | Age: 58
End: 2023-04-24

## 2023-04-24 DIAGNOSIS — E78.5 DYSLIPIDEMIA (HIGH LDL; LOW HDL): ICD-10-CM

## 2023-04-24 DIAGNOSIS — R73.03 PREDIABETES: ICD-10-CM

## 2023-04-24 DIAGNOSIS — R97.20 ELEVATED PSA: ICD-10-CM

## 2023-04-25 LAB
CHOLEST SERPL-MCNC: 204 MG/DL
HDLC SERPL-MCNC: 55 MG/DL
LDLC SERPL CALC-MCNC: 125 MG/DL (ref 0–100)
PSA FREE MFR SERPL: 13.3 %
PSA FREE SERPL-MCNC: 0.32 NG/ML
PSA SERPL-MCNC: 2.4 NG/ML (ref 0–4)
TRIGL SERPL-MCNC: 120 MG/DL

## 2023-04-29 LAB
EST. AVERAGE GLUCOSE BLD GHB EST-MCNC: 117 MG/DL
HBA1C MFR BLD: 5.7 %

## 2023-08-24 DIAGNOSIS — F32.A DEPRESSION, UNSPECIFIED DEPRESSION TYPE: ICD-10-CM

## 2023-08-25 RX ORDER — SERTRALINE HYDROCHLORIDE 100 MG/1
100 TABLET, FILM COATED ORAL DAILY
Qty: 30 TABLET | Refills: 0 | Status: SHIPPED | OUTPATIENT
Start: 2023-08-25

## 2023-10-16 DIAGNOSIS — F32.A DEPRESSION, UNSPECIFIED DEPRESSION TYPE: ICD-10-CM

## 2023-10-16 RX ORDER — SERTRALINE HYDROCHLORIDE 100 MG/1
100 TABLET, FILM COATED ORAL DAILY
Qty: 30 TABLET | Refills: 0 | Status: SHIPPED | OUTPATIENT
Start: 2023-10-16

## 2024-01-22 ENCOUNTER — OFFICE VISIT (OUTPATIENT)
Dept: FAMILY MEDICINE CLINIC | Facility: CLINIC | Age: 59
End: 2024-01-22
Payer: COMMERCIAL

## 2024-01-22 VITALS
WEIGHT: 194 LBS | HEART RATE: 72 BPM | TEMPERATURE: 98.2 F | DIASTOLIC BLOOD PRESSURE: 80 MMHG | HEIGHT: 69 IN | BODY MASS INDEX: 28.73 KG/M2 | SYSTOLIC BLOOD PRESSURE: 120 MMHG | RESPIRATION RATE: 16 BRPM | OXYGEN SATURATION: 98 %

## 2024-01-22 DIAGNOSIS — Z80.0 FAMILY HX OF COLON CANCER: ICD-10-CM

## 2024-01-22 DIAGNOSIS — Z12.11 SCREEN FOR COLON CANCER: ICD-10-CM

## 2024-01-22 DIAGNOSIS — G47.33 OSA ON CPAP: ICD-10-CM

## 2024-01-22 DIAGNOSIS — K21.9 GASTROESOPHAGEAL REFLUX DISEASE, UNSPECIFIED WHETHER ESOPHAGITIS PRESENT: ICD-10-CM

## 2024-01-22 DIAGNOSIS — F41.9 ANXIETY: Primary | ICD-10-CM

## 2024-01-22 DIAGNOSIS — F32.A DEPRESSION, UNSPECIFIED DEPRESSION TYPE: ICD-10-CM

## 2024-01-22 PROBLEM — M54.40 CHRONIC MIDLINE LOW BACK PAIN WITH SCIATICA: Status: ACTIVE | Noted: 2024-01-22

## 2024-01-22 PROBLEM — G89.29 CHRONIC MIDLINE LOW BACK PAIN WITH SCIATICA: Status: ACTIVE | Noted: 2024-01-22

## 2024-01-22 PROCEDURE — 99213 OFFICE O/P EST LOW 20 MIN: CPT | Performed by: INTERNAL MEDICINE

## 2024-01-22 RX ORDER — SERTRALINE HYDROCHLORIDE 100 MG/1
100 TABLET, FILM COATED ORAL DAILY
Qty: 30 TABLET | Refills: 0 | Status: SHIPPED | OUTPATIENT
Start: 2024-01-22

## 2024-01-22 NOTE — PROGRESS NOTES
Name: Roni Negron      : 1965      MRN: [id number]  Encounter Provider: Jordyn Simmons MD  Encounter Date: 2024   Encounter department: [facility]    Assessment & Plan     1. Anxiety  Assessment & Plan:  Test significant anxiety and also a needle phobia. He does take Zoloft daily and Xanax occasionally      2. Gastroesophageal reflux disease, unspecified whether esophagitis present  Assessment & Plan:  Continues to have reflux and is not followed up with any gastroenterologist or take his medicine on a regular basis      3. LEONIE on CPAP  Assessment & Plan:  Is however use his CPAP regularly      4. Depression, unspecified depression type  -     sertraline (ZOLOFT) 100 mg tablet; Take 1 tablet (100 mg total) by mouth daily    5. Family hx of colon cancer  -     Ambulatory Referral to Gastroenterology; Future    6. Screen for colon cancer  -     Ambulatory Referral to Gastroenterology; Future        Tobacco Cessation Counseling: Tobacco cessation counseling was provided. The patient is sincerely urged to quit consumption of tobacco. He is not ready to quit tobacco.         Subjective      Patient is here because he needs refills of his meds and he needed to be seen. He has stopped most of his medication but does continue to take his antianxiety medications plus his PPI. He refuses most testing is extremely needle phobic. He refuses a well exam. However we did discuss his need for colorectal cancer screening of some sort preferably a colonoscopy because his mother had a history of colon cancer. His significant other knows who she went to and will make an appointment. Patient himself however does deny any change in bowel movement or blood in his stool. His last blood work was in April of last year and actually was normal including a PSA.      Review of Systems   Constitutional:  Negative for chills, fatigue, fever and unexpected weight change.   HENT:  Negative for congestion, ear pain,  hearing loss, postnasal drip, sinus pressure, sore throat, trouble swallowing and voice change.    Eyes:  Negative for pain and visual disturbance.   Respiratory:  Negative for cough, chest tightness, shortness of breath and wheezing.    Cardiovascular:  Negative for chest pain, palpitations and leg swelling.   Gastrointestinal:  Negative for abdominal distention, abdominal pain, anal bleeding, blood in stool, constipation, diarrhea, nausea and vomiting.   Endocrine: Negative for cold intolerance, polydipsia, polyphagia and polyuria.   Genitourinary:  Negative for dysuria, flank pain, frequency, hematuria and urgency.   Musculoskeletal:  Negative for arthralgias, back pain, gait problem, joint swelling, myalgias and neck pain.   Skin:  Negative for color change and rash.   Allergic/Immunologic: Negative for immunocompromised state.   Neurological:  Negative for dizziness, seizures, syncope, facial asymmetry, weakness, light-headedness, numbness and headaches.   Hematological:  Negative for adenopathy. Does not bruise/bleed easily.   Psychiatric/Behavioral:  Positive for sleep disturbance. Negative for confusion and suicidal ideas. The patient is nervous/anxious.    All other systems reviewed and are negative.      Current Outpatient Medications on File Prior to Visit   Medication Sig    ALPRAZolam (XANAX) 0.25 mg tablet TAKE 2 TABLETS (0.5 MG TOTAL) BY MOUTH 2 (TWO) TIMES A DAY AS NEEDED FOR ANXIETY    EPINEPHrine (EPIPEN) 0.3 mg/0.3 mL SOAJ Inject 0.3 mL (0.3 mg total) into a muscle once for 1 dose As directed    esomeprazole (NexIUM) 20 mg capsule Take 40 mg by mouth every morning before breakfast    fluticasone (FLONASE) 50 mcg/act nasal spray 1 spray into each nostril daily    [DISCONTINUED] sertraline (ZOLOFT) 100 mg tablet take 1 tablet by mouth once daily    [DISCONTINUED] methocarbamol (ROBAXIN) 500 mg tablet Take 1 tablet (500 mg total) by mouth 2 (two) times a day (Patient not taking: Reported on  "1/22/2024)    [DISCONTINUED] pantoprazole (PROTONIX) 20 mg tablet Take 2 tablets (40 mg total) by mouth daily before breakfast (Patient not taking: Reported on 1/22/2024)       Objective     /80 (BP Location: Left arm, Patient Position: Sitting, Cuff Size: Large)   Pulse 72   Temp 98.2 °F (36.8 °C) (Tympanic)   Resp 16   Ht 5' 9\" (1.753 m)   Wt 88 kg (194 lb)   SpO2 98%   BMI 28.65 kg/m²     Physical Exam  Constitutional:       General: He is not in acute distress.     Appearance: He is well-developed. He is not ill-appearing.   HENT:      Right Ear: External ear normal.      Left Ear: External ear normal.      Nose: Nose normal.      Mouth/Throat:      Pharynx: No oropharyngeal exudate.   Eyes:      Extraocular Movements: Extraocular movements intact.      Conjunctiva/sclera: Conjunctivae normal.      Pupils: Pupils are equal, round, and reactive to light.      Comments: glasses   Neck:      Thyroid: No thyromegaly.      Vascular: No JVD.   Cardiovascular:      Rate and Rhythm: Normal rate and regular rhythm.      Heart sounds: Normal heart sounds. No murmur heard.     No gallop.   Pulmonary:      Effort: Pulmonary effort is normal. No respiratory distress.      Breath sounds: Normal breath sounds. No wheezing or rales.   Abdominal:      General: Bowel sounds are normal. There is no distension.      Palpations: Abdomen is soft. There is no mass.      Tenderness: There is no abdominal tenderness.   Musculoskeletal:         General: No tenderness. Normal range of motion.      Cervical back: Normal range of motion and neck supple.      Right lower leg: No edema.      Left lower leg: No edema.   Lymphadenopathy:      Cervical: No cervical adenopathy.   Skin:     Findings: No rash.   Neurological:      General: No focal deficit present.      Mental Status: He is alert and oriented to person, place, and time. Mental status is at baseline.      Cranial Nerves: No cranial nerve deficit.      Motor: No " weakness.      Coordination: Coordination normal.      Gait: Gait normal.   Psychiatric:         Behavior: Behavior normal.         Thought Content: Thought content normal.         Judgment: Judgment normal.      Comments: anxious       Jordyn Simmons MD

## 2024-01-22 NOTE — ASSESSMENT & PLAN NOTE
Test significant anxiety and also a needle phobia. He does take Zoloft daily and Xanax occasionally

## 2024-01-22 NOTE — ASSESSMENT & PLAN NOTE
Continues to have reflux and is not followed up with any gastroenterologist or take his medicine on a regular basis

## 2024-01-25 ENCOUNTER — TELEPHONE (OUTPATIENT)
Age: 59
End: 2024-01-25

## 2024-01-25 NOTE — TELEPHONE ENCOUNTER
Scheduled date of colonoscopy (as of today): 2/12/24  Physician performing colonoscopy: AUSTIN  Location of colonoscopy: [place]  Bowel prep reviewed with patient: MILI / JOSE  Instructions reviewed with patient by: LYDIA  Clearances:  N/A

## 2024-02-15 ENCOUNTER — CONSULT (OUTPATIENT)
Dept: GASTROENTEROLOGY | Facility: CLINIC | Age: 59
End: 2024-02-15
Payer: COMMERCIAL

## 2024-02-15 VITALS
WEIGHT: 192.8 LBS | OXYGEN SATURATION: 96 % | SYSTOLIC BLOOD PRESSURE: 122 MMHG | HEIGHT: 69 IN | DIASTOLIC BLOOD PRESSURE: 70 MMHG | BODY MASS INDEX: 28.56 KG/M2 | HEART RATE: 80 BPM

## 2024-02-15 DIAGNOSIS — R10.9 ABDOMINAL CRAMPING: ICD-10-CM

## 2024-02-15 DIAGNOSIS — K21.9 CHRONIC GERD: Primary | ICD-10-CM

## 2024-02-15 DIAGNOSIS — K52.9 CHRONIC DIARRHEA: ICD-10-CM

## 2024-02-15 DIAGNOSIS — R11.0 NAUSEA: ICD-10-CM

## 2024-02-15 DIAGNOSIS — Z80.0 FAMILY HISTORY OF COLON CANCER IN MOTHER: ICD-10-CM

## 2024-02-15 DIAGNOSIS — Z12.11 COLON CANCER SCREENING: ICD-10-CM

## 2024-02-15 DIAGNOSIS — F32.A DEPRESSION, UNSPECIFIED DEPRESSION TYPE: ICD-10-CM

## 2024-02-15 PROCEDURE — 99203 OFFICE O/P NEW LOW 30 MIN: CPT | Performed by: PHYSICIAN ASSISTANT

## 2024-02-15 RX ORDER — SERTRALINE HYDROCHLORIDE 100 MG/1
100 TABLET, FILM COATED ORAL DAILY
Qty: 30 TABLET | Refills: 0 | Status: SHIPPED | OUTPATIENT
Start: 2024-02-15

## 2024-02-15 RX ORDER — FAMOTIDINE 40 MG/1
40 TABLET, FILM COATED ORAL
Qty: 30 TABLET | Refills: 2 | Status: SHIPPED | OUTPATIENT
Start: 2024-02-15

## 2024-02-15 RX ORDER — METHYLPREDNISOLONE 4 MG/1
TABLET ORAL
COMMUNITY
Start: 2024-02-07

## 2024-02-15 RX ORDER — FAMOTIDINE 40 MG/1
20 TABLET, FILM COATED ORAL
Qty: 30 TABLET | Refills: 2 | Status: SHIPPED | OUTPATIENT
Start: 2024-02-15 | End: 2024-02-15 | Stop reason: SDUPTHER

## 2024-02-15 NOTE — H&P (VIEW-ONLY)
[facility] - Outpatient Consultation  Roni Negron 58 y.o. male MRN: [id number]  Encounter: [id number]          ASSESSMENT AND PLAN:      1. Chronic GERD  2. Nausea  - He has longstanding GERD for 30+ years with PPI use intermittently associated with nausea and dysphagia at times  - Discussed compliance with nexium 40 mg daily for now, add pepcid 40 mg QHS  - Extensively discussed lifestyle modifications and dietary management; particularly cutting down on caffeine/coffee and not eating close to bedtime  - Schedule EGD given longstanding symptoms and tobacco use, need to rule out Arvizu's    3. Chronic diarrhea  4. Abdominal cramping  - He has urgent postprandial diarrhea >50% of the time associated with lower abdominal cramping and has had isolated episodes of seeing mucous and blood in the stool  - Suspect this is more IBS related but will plan for colonoscopy for further evaluation along with EGD to rule out celiac disease  - Consider rifaximin course pending endoscopic evaluation    5. Colon cancer screening  6. Family history of colon cancer in mother  - Schedule colonoscopy as above to update colon cancer screening given family history and last colonoscopy was 30 years ago    He will need follow up after the EGD/colonoscopy.  ______________________________________________________________________    HPI:  Roni is a 57 yo M with a PMH of chronic GERD, LEONIE, presenting to schedule a colonoscopy as well as a upper endoscopy.  His last colonoscopy was about 30 years ago and he has a family history of colon cancer in his mother.  He reports that he has issues with diarrhea over 50% of the time.  This typically is in the form of urgent postprandial diarrhea after eating dinner and most commonly this happens when eating out.  He had an episode a couple weeks ago where he had a lot of mucus in what looks like some blood in the bowel movement as well.  He has associated lower abdominal  cramping with this.  He also struggles with a lot of acid reflux and regurgitation in addition to nausea.  There are times where he will be vomiting in the middle the night because he feels he has some much acid.  He is taking Nexium 40 mg daily but does not always take this regularly.  He will periods where he takes it every day which generally helps control symptoms and then once he is feeling good he might forget to take it for a few days and then things get a lot worse again.  He drinks significant amount of coffee probably 50 to 60 ounces at least a day.  He has been trying to cut back on spicy food.  He does eat in the middle the night and before bed but reports that sometimes this seems to settle his heartburn and regurgitation.  His last upper endoscopy was again about 30 years ago at the time of his colonoscopy and he believes he was told there was a lot of inflammation but does not recall being told about ulcers or Arvizu's esophagus.  He did not require dilation.  A few years ago he was having a lot of trouble swallowing but Nexium really seem to improve this.  He drinks alcohol maybe once every couple weeks. He chews tobacco.      REVIEW OF SYSTEMS:    CONSTITUTIONAL: Denies any fever, chills, rigors, and weight loss.  HEENT: No earache or tinnitus. Denies hearing loss or visual disturbances.  CARDIOVASCULAR: No chest pain or palpitations.   RESPIRATORY: Denies any cough, hemoptysis, shortness of breath or dyspnea on exertion.  GASTROINTESTINAL: As noted in the History of Present Illness.   GENITOURINARY: No problems with urination. Denies any hematuria or dysuria.  NEUROLOGIC: No dizziness or vertigo, denies headaches.   MUSCULOSKELETAL: Denies any muscle or joint pain.   SKIN: Denies skin rashes or itching.   ENDOCRINE: Denies excessive thirst. Denies intolerance to heat or cold.  PSYCHOSOCIAL: Denies depression or anxiety. Denies any recent memory loss.       Historical Information   Past Medical  "History:   Diagnosis Date    GERD (gastroesophageal reflux disease)     LEONIE (obstructive sleep apnea)     Psychiatric disorder      History reviewed. No pertinent surgical history.  Social History   Social History     Substance and Sexual Activity   Alcohol Use Yes    Alcohol/week: 1.0 standard drink of alcohol    Types: 1 Cans of beer per week    Comment: holidays     Social History     Substance and Sexual Activity   Drug Use No     Social History     Tobacco Use   Smoking Status Former    Average packs/day: 3.0 packs/day for 28.0 years (84.0 ttl pk-yrs)    Types: Cigarettes, Cigars    Start date: 1984    Passive exposure: Past   Smokeless Tobacco Current    Types: Chew     Family History   Problem Relation Age of Onset    Lung cancer Father     Colon cancer Mother     Cri-du-chat syndrome Brother        Meds/Allergies       Current Outpatient Medications:     ALPRAZolam (XANAX) 0.25 mg tablet    esomeprazole (NexIUM) 20 mg capsule    famotidine (PEPCID) 40 MG tablet    fluticasone (FLONASE) 50 mcg/act nasal spray    methylPREDNISolone 4 MG tablet therapy pack    sertraline (ZOLOFT) 100 mg tablet    EPINEPHrine (EPIPEN) 0.3 mg/0.3 mL SOAJ    Allergies   Allergen Reactions    White Faced Hornet Venom Hives           Objective     Blood pressure 122/70, pulse 80, height 5' 9\" (1.753 m), weight 87.5 kg (192 lb 12.8 oz), SpO2 96%. Body mass index is 28.47 kg/m².        PHYSICAL EXAM:      General Appearance:   Alert, cooperative, no distress   HEENT:   Normocephalic, atraumatic, anicteric.     Neck:  Supple, symmetrical, trachea midline   Lungs:   Clear to auscultation bilaterally; no rales, rhonchi or wheezing; respirations unlabored    Heart::   Regular rate and rhythm; no murmur, rub, or gallop.   Abdomen:   Soft, non-tender, non-distended; normal bowel sounds; no masses, no organomegaly    Genitalia:   Deferred    Rectal:   Deferred    Extremities:  No cyanosis, clubbing or edema    Pulses:  2+ and symmetric  "   Skin:  No jaundice, rashes, or lesions    Lymph nodes:  No palpable cervical lymphadenopathy        Lab Results:   No visits with results within 1 Day(s) from this visit.   Latest known visit with results is:   Appointment on 04/24/2023   Component Date Value    Prostate Specific Antige* 04/24/2023 2.4     PSA, Free 04/24/2023 0.32     PSA, Free Pct 04/24/2023 13.3     Hemoglobin A1C 04/24/2023 5.7 (H)     EAG 04/24/2023 117     Cholesterol 04/24/2023 204 (H)     Triglycerides 04/24/2023 120     HDL, Direct 04/24/2023 55     LDL Calculated 04/24/2023 125 (H)          Radiology Results:   No results found.

## 2024-02-15 NOTE — PATIENT INSTRUCTIONS
Scheduled date of EGD/colonoscopy (as of today):2/21/24  Physician performing EGD/colonoscopy:Carlos  Location of EGD/colonoscopy:[place]  Desired bowel prep reviewed with patient:miralax/dulcolax  Instructions reviewed with patient by:Olivia POLANCO  Clearances:   none  
3 weeks

## 2024-02-15 NOTE — PROGRESS NOTES
[facility] - Outpatient Consultation  Roni Negron 58 y.o. male MRN: [id number]  Encounter: [id number]          ASSESSMENT AND PLAN:      1. Chronic GERD  2. Nausea  - He has longstanding GERD for 30+ years with PPI use intermittently associated with nausea and dysphagia at times  - Discussed compliance with nexium 40 mg daily for now, add pepcid 40 mg QHS  - Extensively discussed lifestyle modifications and dietary management; particularly cutting down on caffeine/coffee and not eating close to bedtime  - Schedule EGD given longstanding symptoms and tobacco use, need to rule out Arvizu's    3. Chronic diarrhea  4. Abdominal cramping  - He has urgent postprandial diarrhea >50% of the time associated with lower abdominal cramping and has had isolated episodes of seeing mucous and blood in the stool  - Suspect this is more IBS related but will plan for colonoscopy for further evaluation along with EGD to rule out celiac disease  - Consider rifaximin course pending endoscopic evaluation    5. Colon cancer screening  6. Family history of colon cancer in mother  - Schedule colonoscopy as above to update colon cancer screening given family history and last colonoscopy was 30 years ago    He will need follow up after the EGD/colonoscopy.  ______________________________________________________________________    HPI:  Roni is a 57 yo M with a PMH of chronic GERD, LEONIE, presenting to schedule a colonoscopy as well as a upper endoscopy.  His last colonoscopy was about 30 years ago and he has a family history of colon cancer in his mother.  He reports that he has issues with diarrhea over 50% of the time.  This typically is in the form of urgent postprandial diarrhea after eating dinner and most commonly this happens when eating out.  He had an episode a couple weeks ago where he had a lot of mucus in what looks like some blood in the bowel movement as well.  He has associated lower abdominal  cramping with this.  He also struggles with a lot of acid reflux and regurgitation in addition to nausea.  There are times where he will be vomiting in the middle the night because he feels he has some much acid.  He is taking Nexium 40 mg daily but does not always take this regularly.  He will periods where he takes it every day which generally helps control symptoms and then once he is feeling good he might forget to take it for a few days and then things get a lot worse again.  He drinks significant amount of coffee probably 50 to 60 ounces at least a day.  He has been trying to cut back on spicy food.  He does eat in the middle the night and before bed but reports that sometimes this seems to settle his heartburn and regurgitation.  His last upper endoscopy was again about 30 years ago at the time of his colonoscopy and he believes he was told there was a lot of inflammation but does not recall being told about ulcers or Arvizu's esophagus.  He did not require dilation.  A few years ago he was having a lot of trouble swallowing but Nexium really seem to improve this.  He drinks alcohol maybe once every couple weeks. He chews tobacco.      REVIEW OF SYSTEMS:    CONSTITUTIONAL: Denies any fever, chills, rigors, and weight loss.  HEENT: No earache or tinnitus. Denies hearing loss or visual disturbances.  CARDIOVASCULAR: No chest pain or palpitations.   RESPIRATORY: Denies any cough, hemoptysis, shortness of breath or dyspnea on exertion.  GASTROINTESTINAL: As noted in the History of Present Illness.   GENITOURINARY: No problems with urination. Denies any hematuria or dysuria.  NEUROLOGIC: No dizziness or vertigo, denies headaches.   MUSCULOSKELETAL: Denies any muscle or joint pain.   SKIN: Denies skin rashes or itching.   ENDOCRINE: Denies excessive thirst. Denies intolerance to heat or cold.  PSYCHOSOCIAL: Denies depression or anxiety. Denies any recent memory loss.       Historical Information   Past Medical  "History:   Diagnosis Date    GERD (gastroesophageal reflux disease)     LEONIE (obstructive sleep apnea)     Psychiatric disorder      History reviewed. No pertinent surgical history.  Social History   Social History     Substance and Sexual Activity   Alcohol Use Yes    Alcohol/week: 1.0 standard drink of alcohol    Types: 1 Cans of beer per week    Comment: holidays     Social History     Substance and Sexual Activity   Drug Use No     Social History     Tobacco Use   Smoking Status Former    Average packs/day: 3.0 packs/day for 28.0 years (84.0 ttl pk-yrs)    Types: Cigarettes, Cigars    Start date: 1984    Passive exposure: Past   Smokeless Tobacco Current    Types: Chew     Family History   Problem Relation Age of Onset    Lung cancer Father     Colon cancer Mother     Cri-du-chat syndrome Brother        Meds/Allergies       Current Outpatient Medications:     ALPRAZolam (XANAX) 0.25 mg tablet    esomeprazole (NexIUM) 20 mg capsule    famotidine (PEPCID) 40 MG tablet    fluticasone (FLONASE) 50 mcg/act nasal spray    methylPREDNISolone 4 MG tablet therapy pack    sertraline (ZOLOFT) 100 mg tablet    EPINEPHrine (EPIPEN) 0.3 mg/0.3 mL SOAJ    Allergies   Allergen Reactions    White Faced Hornet Venom Hives           Objective     Blood pressure 122/70, pulse 80, height 5' 9\" (1.753 m), weight 87.5 kg (192 lb 12.8 oz), SpO2 96%. Body mass index is 28.47 kg/m².        PHYSICAL EXAM:      General Appearance:   Alert, cooperative, no distress   HEENT:   Normocephalic, atraumatic, anicteric.     Neck:  Supple, symmetrical, trachea midline   Lungs:   Clear to auscultation bilaterally; no rales, rhonchi or wheezing; respirations unlabored    Heart::   Regular rate and rhythm; no murmur, rub, or gallop.   Abdomen:   Soft, non-tender, non-distended; normal bowel sounds; no masses, no organomegaly    Genitalia:   Deferred    Rectal:   Deferred    Extremities:  No cyanosis, clubbing or edema    Pulses:  2+ and symmetric  "   Skin:  No jaundice, rashes, or lesions    Lymph nodes:  No palpable cervical lymphadenopathy        Lab Results:   No visits with results within 1 Day(s) from this visit.   Latest known visit with results is:   Appointment on 04/24/2023   Component Date Value    Prostate Specific Antige* 04/24/2023 2.4     PSA, Free 04/24/2023 0.32     PSA, Free Pct 04/24/2023 13.3     Hemoglobin A1C 04/24/2023 5.7 (H)     EAG 04/24/2023 117     Cholesterol 04/24/2023 204 (H)     Triglycerides 04/24/2023 120     HDL, Direct 04/24/2023 55     LDL Calculated 04/24/2023 125 (H)          Radiology Results:   No results found.

## 2024-02-21 ENCOUNTER — ANESTHESIA EVENT (OUTPATIENT)
Dept: GASTROENTEROLOGY | Facility: HOSPITAL | Age: 59
End: 2024-02-21

## 2024-02-21 ENCOUNTER — ANESTHESIA (OUTPATIENT)
Dept: GASTROENTEROLOGY | Facility: HOSPITAL | Age: 59
End: 2024-02-21

## 2024-02-21 ENCOUNTER — HOSPITAL ENCOUNTER (OUTPATIENT)
Dept: GASTROENTEROLOGY | Facility: HOSPITAL | Age: 59
Setting detail: OUTPATIENT SURGERY
Discharge: HOME | End: 2024-02-21
Attending: INTERNAL MEDICINE
Payer: COMMERCIAL

## 2024-02-21 VITALS
WEIGHT: 186.73 LBS | SYSTOLIC BLOOD PRESSURE: 116 MMHG | HEIGHT: 69 IN | OXYGEN SATURATION: 100 % | TEMPERATURE: 97.9 F | RESPIRATION RATE: 20 BRPM | DIASTOLIC BLOOD PRESSURE: 78 MMHG | HEART RATE: 62 BPM | BODY MASS INDEX: 27.66 KG/M2

## 2024-02-21 DIAGNOSIS — K21.9 CHRONIC GERD: ICD-10-CM

## 2024-02-21 DIAGNOSIS — R11.0 NAUSEA: ICD-10-CM

## 2024-02-21 DIAGNOSIS — Z12.11 SCREENING FOR COLON CANCER: ICD-10-CM

## 2024-02-21 PROCEDURE — 88305 TISSUE EXAM BY PATHOLOGIST: CPT | Performed by: STUDENT IN AN ORGANIZED HEALTH CARE EDUCATION/TRAINING PROGRAM

## 2024-02-21 PROCEDURE — 45385 COLONOSCOPY W/LESION REMOVAL: CPT | Performed by: INTERNAL MEDICINE

## 2024-02-21 PROCEDURE — 45380 COLONOSCOPY AND BIOPSY: CPT | Performed by: INTERNAL MEDICINE

## 2024-02-21 PROCEDURE — 43239 EGD BIOPSY SINGLE/MULTIPLE: CPT | Performed by: INTERNAL MEDICINE

## 2024-02-21 PROCEDURE — 88342 IMHCHEM/IMCYTCHM 1ST ANTB: CPT | Performed by: STUDENT IN AN ORGANIZED HEALTH CARE EDUCATION/TRAINING PROGRAM

## 2024-02-21 RX ORDER — LIDOCAINE HYDROCHLORIDE 20 MG/ML
INJECTION, SOLUTION EPIDURAL; INFILTRATION; INTRACAUDAL; PERINEURAL AS NEEDED
Status: DISCONTINUED | OUTPATIENT
Start: 2024-02-21 | End: 2024-02-21

## 2024-02-21 RX ORDER — PROPOFOL 10 MG/ML
INJECTION, EMULSION INTRAVENOUS AS NEEDED
Status: DISCONTINUED | OUTPATIENT
Start: 2024-02-21 | End: 2024-02-21

## 2024-02-21 RX ORDER — SODIUM CHLORIDE, SODIUM LACTATE, POTASSIUM CHLORIDE, CALCIUM CHLORIDE 600; 310; 30; 20 MG/100ML; MG/100ML; MG/100ML; MG/100ML
INJECTION, SOLUTION INTRAVENOUS CONTINUOUS PRN
Status: DISCONTINUED | OUTPATIENT
Start: 2024-02-21 | End: 2024-02-21

## 2024-02-21 RX ADMIN — PROPOFOL 100 MG: 10 INJECTION, EMULSION INTRAVENOUS at 08:56

## 2024-02-21 RX ADMIN — PROPOFOL 20 MG: 10 INJECTION, EMULSION INTRAVENOUS at 09:14

## 2024-02-21 RX ADMIN — SODIUM CHLORIDE, SODIUM LACTATE, POTASSIUM CHLORIDE, AND CALCIUM CHLORIDE: .6; .31; .03; .02 INJECTION, SOLUTION INTRAVENOUS at 08:56

## 2024-02-21 RX ADMIN — PROPOFOL 20 MG: 10 INJECTION, EMULSION INTRAVENOUS at 09:16

## 2024-02-21 RX ADMIN — PROPOFOL 20 MG: 10 INJECTION, EMULSION INTRAVENOUS at 09:08

## 2024-02-21 RX ADMIN — PROPOFOL 40 MG: 10 INJECTION, EMULSION INTRAVENOUS at 08:58

## 2024-02-21 RX ADMIN — Medication 10 MG: at 09:11

## 2024-02-21 RX ADMIN — LIDOCAINE HYDROCHLORIDE 100 MG: 20 INJECTION, SOLUTION EPIDURAL; INFILTRATION; INTRACAUDAL; PERINEURAL at 08:56

## 2024-02-21 RX ADMIN — PROPOFOL 20 MG: 10 INJECTION, EMULSION INTRAVENOUS at 09:05

## 2024-02-21 RX ADMIN — PROPOFOL 30 MG: 10 INJECTION, EMULSION INTRAVENOUS at 09:01

## 2024-02-21 RX ADMIN — PROPOFOL 20 MG: 10 INJECTION, EMULSION INTRAVENOUS at 09:11

## 2024-02-21 RX ADMIN — PROPOFOL 20 MG: 10 INJECTION, EMULSION INTRAVENOUS at 09:03

## 2024-02-21 NOTE — ANESTHESIA PREPROCEDURE EVALUATION
Procedure:  COLONOSCOPY  EGD    Relevant Problems   ANESTHESIA (within normal limits)      CARDIO (within normal limits)      ENDO (within normal limits)      GI/HEPATIC  Confirmed NPO appropriate  S/p bowel prep   (+) Esophageal reflux      /RENAL (within normal limits)      HEMATOLOGY (within normal limits)      MUSCULOSKELETAL   (+) Chronic midline low back pain with sciatica      NEURO/PSYCH   (+) Anxiety   (+) Chronic midline low back pain with sciatica   (+) Depression   (+) Severe needle phobia      PULMONARY  Former heavy smoker, currently chews tobacco   (+) LEONIE on CPAP   (-) URI (upper respiratory infection)        Physical Exam    Airway    Mallampati score: II  TM Distance: >3 FB  Neck ROM: full     Dental   Comment: Poor dentition     Cardiovascular  Rhythm: regular, Rate: normal    Pulmonary   Breath sounds clear to auscultation    Other Findings        Anesthesia Plan  ASA Score- 2     Anesthesia Type- IV sedation with anesthesia with ASA Monitors.         Additional Monitors:     Airway Plan:     Comment: I discussed the risks and benefits of IV sedation anesthesia including the possibility of the need to convert to general anesthesia and the potential risk of awareness.  The patient was given the opportunity to ask questions, which were answered..       Plan Factors-Exercise tolerance (METS): >4 METS.    Chart reviewed.        Patient is not a current smoker.      Obstructive sleep apnea risk education given perioperatively.        Induction- intravenous.    Postoperative Plan-     Informed Consent- Anesthetic plan and risks discussed with patient and spouse.  I personally reviewed this patient with the CRNA. Discussed and agreed on the Anesthesia Plan with the CRNA..

## 2024-02-21 NOTE — ANESTHESIA POSTPROCEDURE EVALUATION
Post-Op Assessment Note    CV Status:  Stable    Pain management: adequate       Mental Status:  Alert and awake   Hydration Status:  Euvolemic   PONV Controlled:  Controlled   Airway Patency:  Patent     Post Op Vitals Reviewed: Yes    No anethesia notable event occurred.    Staff: RADHA               /65 (02/21/24 0926)    Temp 97.9 °F (36.6 °C) (02/21/24 0926)    Pulse 62 (02/21/24 0926)   Resp 17 (02/21/24 0926)    SpO2 96 % (02/21/24 0926)

## 2024-02-23 DIAGNOSIS — A07.1 GIARDIA: Primary | ICD-10-CM

## 2024-02-23 PROCEDURE — 88305 TISSUE EXAM BY PATHOLOGIST: CPT | Performed by: STUDENT IN AN ORGANIZED HEALTH CARE EDUCATION/TRAINING PROGRAM

## 2024-02-23 PROCEDURE — 88342 IMHCHEM/IMCYTCHM 1ST ANTB: CPT | Performed by: STUDENT IN AN ORGANIZED HEALTH CARE EDUCATION/TRAINING PROGRAM

## 2024-02-23 RX ORDER — METRONIDAZOLE 500 MG/1
500 TABLET ORAL EVERY 12 HOURS SCHEDULED
Qty: 14 TABLET | Refills: 0 | Status: SHIPPED | OUTPATIENT
Start: 2024-02-23 | End: 2024-03-01

## 2024-02-26 ENCOUNTER — OFFICE VISIT (OUTPATIENT)
Dept: FAMILY MEDICINE CLINIC | Facility: CLINIC | Age: 59
End: 2024-02-26
Payer: COMMERCIAL

## 2024-02-26 VITALS
BODY MASS INDEX: 27.55 KG/M2 | HEIGHT: 69 IN | SYSTOLIC BLOOD PRESSURE: 122 MMHG | OXYGEN SATURATION: 97 % | WEIGHT: 186 LBS | TEMPERATURE: 97.7 F | DIASTOLIC BLOOD PRESSURE: 72 MMHG | HEART RATE: 84 BPM

## 2024-02-26 DIAGNOSIS — R05.9 COUGH, UNSPECIFIED TYPE: ICD-10-CM

## 2024-02-26 DIAGNOSIS — J10.1 INFLUENZA A: Primary | ICD-10-CM

## 2024-02-26 DIAGNOSIS — R52 BODY ACHES: ICD-10-CM

## 2024-02-26 LAB
SARS-COV-2 AG UPPER RESP QL IA: NEGATIVE
SL AMB POCT RAPID FLU A: ABNORMAL
SL AMB POCT RAPID FLU B: ABNORMAL
VALID CONTROL: NORMAL

## 2024-02-26 PROCEDURE — 87804 INFLUENZA ASSAY W/OPTIC: CPT | Performed by: FAMILY MEDICINE

## 2024-02-26 PROCEDURE — 87811 SARS-COV-2 COVID19 W/OPTIC: CPT | Performed by: FAMILY MEDICINE

## 2024-02-26 PROCEDURE — 99213 OFFICE O/P EST LOW 20 MIN: CPT | Performed by: FAMILY MEDICINE

## 2024-02-26 RX ORDER — OSELTAMIVIR PHOSPHATE 75 MG/1
75 CAPSULE ORAL 2 TIMES DAILY
Qty: 10 CAPSULE | Refills: 0 | Status: SHIPPED | OUTPATIENT
Start: 2024-02-26 | End: 2024-03-02

## 2024-02-26 NOTE — PROGRESS NOTES
Outpatient Progress Note    Assessment/Plan:    Problem List Items Addressed This Visit    None  Visit Diagnoses       Influenza A    -  Primary    Relevant Medications    oseltamivir (TAMIFLU) 75 mg capsule    Body aches        Relevant Orders    POCT Rapid Covid Ag    POCT rapid flu A and B    Cough, unspecified type        Relevant Orders    POCT Rapid Covid Ag    POCT rapid flu A and B           Patient positive for influenza A on testing today, will provide patient with Tamiflu for symptomatic relief, will provide 10 tablets for patient's wife to start taking once a day    Disposition:     I have spent a total time of 15 minutes on the day of the encounter for this patient including       Encounter provider: Nabil Elena MD     Provider located at: [facility]  [address]  [place] [address] [phone]     Recent Visits  No visits were found meeting these conditions.  Showing recent visits within past 7 days and meeting all other requirements  Today's Visits  Date Type Provider Dept   02/26/24 Office Visit Nabil Elena MD [facility]   Showing today's visits and meeting all other requirements  Future Appointments  No visits were found meeting these conditions.  Showing future appointments within next 150 days and meeting all other requirements     Subjective:   Roni Negron is a 58 y.o. male who is concerned about COVID-19. Patient's symptoms include fever (101.4), chills, fatigue, malaise, nasal congestion, rhinorrhea, sore throat (improving), cough, myalgias and headache. Patient denies anosmia, loss of taste, shortness of breath, chest tightness, abdominal pain, nausea, vomiting and diarrhea.     - Date of symptom onset: 2/24/2024      COVID-19 vaccination status: Fully vaccinated with booster    Exposure:   Contact with a person who is under investigation (PUI) for or who is positive for COVID-19 within the last 14 days?:  No    Hospitalized recently for fever and/or lower respiratory symptoms?: No      Currently a healthcare worker that is involved in direct patient care?: No      Works in a special setting where the risk of COVID-19 transmission may be high? (this may include long-term care, correctional and [facility] facilities; homeless shelters; assisted-living facilities and group homes.): No      Resident in a special setting where the risk of COVID-19 transmission may be high? (this may include long-term care, correctional and [facility] facilities; homeless shelters; assisted-living facilities and group homes.): No      Patient is represented by his partner (peter)  Decreased appetite    Lab Results   Component Value Date    SARSCOV2 Negative 05/10/2022       Review of Systems   Constitutional:  Positive for chills, fatigue and fever (101.4).   HENT:  Positive for congestion, rhinorrhea and sore throat (improving).    Respiratory:  Positive for cough. Negative for chest tightness and shortness of breath.    Gastrointestinal:  Negative for abdominal pain, diarrhea, nausea and vomiting.   Musculoskeletal:  Positive for myalgias.   Neurological:  Positive for headaches.     Current Outpatient Medications on File Prior to Visit   Medication Sig    ALPRAZolam (XANAX) 0.25 mg tablet TAKE 2 TABLETS (0.5 MG TOTAL) BY MOUTH 2 (TWO) TIMES A DAY AS NEEDED FOR ANXIETY    EPINEPHrine (EPIPEN) 0.3 mg/0.3 mL SOAJ Inject 0.3 mL (0.3 mg total) into a muscle once for 1 dose As directed    esomeprazole (NexIUM) 20 mg capsule Take 40 mg by mouth every morning before breakfast    famotidine (PEPCID) 40 MG tablet Take 1 tablet (40 mg total) by mouth daily at bedtime    methylPREDNISolone 4 MG tablet therapy pack use as directed FOLLOW DIRECTIONS ON BACK OF FOIL PACK    metroNIDAZOLE (FLAGYL) 500 mg tablet Take 1 tablet (500 mg total) by mouth every 12 (twelve) hours for 7 days    sertraline (ZOLOFT) 100 mg tablet take 1 tablet by mouth once daily  "   fluticasone (FLONASE) 50 mcg/act nasal spray 1 spray into each nostril daily (Patient not taking: Reported on 2/26/2024)       Objective:    /72 (BP Location: Left arm, Patient Position: Sitting, Cuff Size: Standard)   Pulse 84   Temp 97.7 °F (36.5 °C)   Ht 5' 9\" (1.753 m)   Wt 84.4 kg (186 lb)   SpO2 97%   BMI 27.47 kg/m²        Physical Exam  Vitals reviewed.   Constitutional:       General: He is not in acute distress.     Appearance: Normal appearance. He is not ill-appearing, toxic-appearing or diaphoretic.   Cardiovascular:      Rate and Rhythm: Normal rate.      Pulses: Normal pulses.   Pulmonary:      Effort: Pulmonary effort is normal.      Comments: cough  Abdominal:      General: Abdomen is flat.   Musculoskeletal:         General: No swelling or deformity.   Skin:     General: Skin is warm and dry.      Capillary Refill: Capillary refill takes less than 2 seconds.      Coloration: Skin is not jaundiced.   Neurological:      General: No focal deficit present.      Mental Status: He is alert.   Psychiatric:         Mood and Affect: Mood normal.       Nabil Elena MD    "

## 2024-03-04 ENCOUNTER — TELEPHONE (OUTPATIENT)
Age: 59
End: 2024-03-04

## 2024-03-04 ENCOUNTER — CONSULT (OUTPATIENT)
Age: 59
End: 2024-03-04
Payer: COMMERCIAL

## 2024-03-04 VITALS
DIASTOLIC BLOOD PRESSURE: 74 MMHG | SYSTOLIC BLOOD PRESSURE: 124 MMHG | WEIGHT: 192 LBS | BODY MASS INDEX: 28.44 KG/M2 | HEART RATE: 73 BPM | OXYGEN SATURATION: 96 % | HEIGHT: 69 IN | TEMPERATURE: 98.4 F

## 2024-03-04 DIAGNOSIS — E66.3 OVERWEIGHT (BMI 25.0-29.9): ICD-10-CM

## 2024-03-04 DIAGNOSIS — G47.33 OSA ON CPAP: Primary | ICD-10-CM

## 2024-03-04 PROCEDURE — 99204 OFFICE O/P NEW MOD 45 MIN: CPT | Performed by: INTERNAL MEDICINE

## 2024-03-04 NOTE — PROGRESS NOTES
"Assessment/Plan:     Diagnoses and all orders for this visit:    LEONIE on CPAP  -     PAP DME Resupply/Reorder    Overweight (BMI 25.0-29.9)          Plan for follow up:  LEONIE on CPAP severe obstructive sleep apnea with an AHI of 32 on CPAP 10 cm currently using the replacement 1 from [employer].  Currently not eligible for a new CPAP   I have reviewed the download compliance on his phone, he has been using it 100% of the time, mask is 100% mask fit the residual AHI is been on an average less than 5 ranging around 1.0-2.4.  Discussed again cleaning of the supplies and change of PAP supplies discussed  Caution against driving when sleepy.  Recommend weight loss  Follow-up in 1 year or earlier as needed  No follow-ups on file.  All questions are answered to the patient's satisfaction and understanding.  He verbalizes understanding.  He is encouraged to call with any further questions or concerns.    Portions of the record may have been created with voice recognition software.  Occasional wrong word or \"sound a like\" substitutions may have occurred due to the inherent limitations of voice recognition software.  Read the chart carefully and recognize, using context, where substitutions have occurred.a    Electronically Signed by Blanca Davidson MD    ______________________________________________________________________    Chief Complaint:   Chief Complaint   Patient presents with    Sleep Apnea        Patient ID: Roni is a 58 y.o. y.o. male has a past medical history of GERD (gastroesophageal reflux disease), LEONIE (obstructive sleep apnea), and Psychiatric disorder.    3/4/2024  Patient presents today for initial visit.  Roni is a very pleasant 58-year-old gentleman with prior history of obstructive sleep apnea diagnosed in 2019 via home sleep study with severe obstructive sleep apnea with an AHI of 32 on CPAP 10 cm has been consistently using it since then.  He did not come for a follow-up after that, his " machine was recalled and he did get a replacement 1/9/2021 late 2021/early 2022 he states, from [employer] and he has been using that currently.  He has been using it nightly no significant nocturnal awakenings and feels well rested when he is up in the morning.  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    HENT: Negative.     Eyes: Negative.    Respiratory: Negative.     Cardiovascular: Negative.    Gastrointestinal: Negative.    Endocrine: Negative.    Genitourinary: Negative.    Musculoskeletal: Negative.    Skin: Negative.    Allergic/Immunologic: Negative.    Neurological: Negative.    Hematological: Negative.    Psychiatric/Behavioral: Negative.         Social history: He reports that he has quit smoking. His smoking use included cigarettes and cigars. He started smoking about 40 years ago. He has a 84 pack-year smoking history. He has been exposed to tobacco smoke. His smokeless tobacco use includes chew. He reports current alcohol use of about 1.0 standard drink of alcohol per week. He reports that he does not use drugs.    Past surgical history: History reviewed. No pertinent surgical history.  Family history:   Family History   Problem Relation Age of Onset    Lung cancer Father     Colon cancer Mother     Cri-du-chat syndrome Brother        Immunization History   Administered Date(s) Administered    COVID-19 PFIZER VACCINE 0.3 ML IM 03/13/2021, 04/03/2021, 12/22/2021    Tdap 05/05/2014     Current Outpatient Medications   Medication Sig Dispense Refill    ALPRAZolam (XANAX) 0.25 mg tablet TAKE 2 TABLETS (0.5 MG TOTAL) BY MOUTH 2 (TWO) TIMES A DAY AS NEEDED FOR ANXIETY 60 tablet 0    EPINEPHrine (EPIPEN) 0.3 mg/0.3 mL SOAJ Inject 0.3 mL (0.3 mg total) into a muscle once for 1 dose As directed 1 each 1  "   esomeprazole (NexIUM) 20 mg capsule Take 40 mg by mouth every morning before breakfast      famotidine (PEPCID) 40 MG tablet Take 1 tablet (40 mg total) by mouth daily at bedtime 30 tablet 2    sertraline (ZOLOFT) 100 mg tablet take 1 tablet by mouth once daily 30 tablet 0    fluticasone (FLONASE) 50 mcg/act nasal spray 1 spray into each nostril daily (Patient not taking: Reported on 2/26/2024) 9.9 mL 0    methylPREDNISolone 4 MG tablet therapy pack use as directed FOLLOW DIRECTIONS ON BACK OF FOIL PACK (Patient not taking: Reported on 3/4/2024)       No current facility-administered medications for this visit.     Allergies: White faced hornet venom    Objective:  Vitals:    03/04/24 1037   BP: 124/74   Pulse: 73   Temp: 98.4 °F (36.9 °C)   SpO2: 96%   Weight: 87.1 kg (192 lb)   Height: 5' 9\" (1.753 m)   Oxygen Therapy  SpO2: 96 %  .  Wt Readings from Last 3 Encounters:   03/04/24 87.1 kg (192 lb)   02/26/24 84.4 kg (186 lb)   02/21/24 84.7 kg (186 lb 11.7 oz)     Body mass index is 28.35 kg/m².    Physical Exam  Vitals and nursing note reviewed.   Constitutional:       Appearance: He is well-developed.   HENT:      Head: Normocephalic and atraumatic.      Mouth/Throat:      Comments: Crowded oropharyngeal airways  Eyes:      Conjunctiva/sclera: Conjunctivae normal.      Pupils: Pupils are equal, round, and reactive to light.   Neck:      Thyroid: No thyromegaly.      Vascular: No JVD.   Cardiovascular:      Rate and Rhythm: Normal rate and regular rhythm.      Heart sounds: Normal heart sounds. No murmur heard.     No friction rub. No gallop.   Pulmonary:      Effort: Pulmonary effort is normal. No respiratory distress.      Breath sounds: Normal breath sounds. No wheezing or rales.   Chest:      Chest wall: No tenderness.   Musculoskeletal:         General: No tenderness or deformity. Normal range of motion.      Cervical back: Normal range of motion and neck supple.   Lymphadenopathy:      Cervical: No " cervical adenopathy.   Skin:     General: Skin is warm and dry.   Neurological:      Mental Status: He is alert and oriented to person, place, and time.         Lab Review:   Office Visit on 02/26/2024   Component Date Value    POCT SARS-CoV-2 Ag 02/26/2024 Negative     VALID CONTROL 02/26/2024 Valid     RAPID FLU A 02/26/2024 POS     RAPID FLU B 02/26/2024 NEG    Hospital Outpatient Visit on 02/21/2024   Component Date Value    Case Report 02/21/2024                      Value:Surgical Pathology Report                         Case: [id number]                                  Authorizing Provider:  Lc Gonzalez MD           Collected:           02/21/2024 0900              Ordering Location:      [facility]       Received:            02/21/2024 1001                                     [facility]                                                             Pathologist:           Sunil Funes DO                                                          Specimens:   A) - Duodenum                                                                                       B) - Stomach                                                                                        C) - Polyp, Colorectal, cecum                                                                       D) - Polyp, Colorectal, ascending x2                                                                E) - Polyp, Colorectal, hepatic                                                                                               F) - Polyp, Colorectal, transverse x3                                                               G) - Colon, random colon                                                                            H) - Polyp, Colorectal, sigmoid                                                            Final Diagnosis 02/21/2024                      Value:This result contains rich text formatting which cannot be displayed here.     Additional Information 02/21/2024                      Value:This result contains rich text formatting which cannot be displayed here.    Synoptic Checklist 02/21/2024                      Value:                            COLON/RECTUM POLYP FORM - GI - H, F, E, D, C                                                                                     :    Adenoma(s)                                                         :    Other      Gross Description 02/21/2024                      Value:This result contains rich text formatting which cannot be displayed here.    Clinical Information 02/21/2024                      Value:Biopsies r/o H Pylori       Diagnostics:  I have personally reviewed pertinent reports.    ESS: Total score: 0

## 2024-03-05 LAB

## 2024-03-12 DIAGNOSIS — F32.A DEPRESSION, UNSPECIFIED DEPRESSION TYPE: ICD-10-CM

## 2024-03-12 RX ORDER — SERTRALINE HYDROCHLORIDE 100 MG/1
TABLET, FILM COATED ORAL
Qty: 30 TABLET | Refills: 5 | Status: SHIPPED | OUTPATIENT
Start: 2024-03-12

## 2024-10-26 DIAGNOSIS — F32.A DEPRESSION, UNSPECIFIED DEPRESSION TYPE: ICD-10-CM

## 2024-10-28 DIAGNOSIS — K21.9 CHRONIC GERD: ICD-10-CM

## 2024-10-28 DIAGNOSIS — R11.0 NAUSEA: ICD-10-CM

## 2024-10-28 RX ORDER — FAMOTIDINE 40 MG/1
40 TABLET, FILM COATED ORAL
Qty: 90 TABLET | Refills: 1 | Status: SHIPPED | OUTPATIENT
Start: 2024-10-28

## 2024-10-28 NOTE — TELEPHONE ENCOUNTER
Reason for call:   [x] Refill   [] Prior Auth  [x] Other: Pharmacy is requesting a 90 day supply    Office:   [] PCP/Provider -   [x] Specialty/Provider - Gastro/[place] [facility]     Medication: famotidine (PEPCID) 40 MG tablet     Dose/Frequency: 1 tablet daily at bedtime    Quantity: 90    Pharmacy: [facility] - [place], [place]     Does the patient have enough for 3 days?   [] Yes   [x] No - Send as HP to POD

## 2024-10-29 RX ORDER — SERTRALINE HYDROCHLORIDE 100 MG/1
TABLET, FILM COATED ORAL
Qty: 30 TABLET | Refills: 5 | Status: SHIPPED | OUTPATIENT
Start: 2024-10-29

## 2024-10-30 ENCOUNTER — TELEPHONE (OUTPATIENT)
Age: 59
End: 2024-10-30

## 2024-10-30 ENCOUNTER — CONSULT (OUTPATIENT)
Dept: FAMILY MEDICINE CLINIC | Facility: CLINIC | Age: 59
End: 2024-10-30
Payer: COMMERCIAL

## 2024-10-30 VITALS
TEMPERATURE: 98.2 F | DIASTOLIC BLOOD PRESSURE: 70 MMHG | HEART RATE: 82 BPM | SYSTOLIC BLOOD PRESSURE: 112 MMHG | HEIGHT: 69 IN | BODY MASS INDEX: 29.77 KG/M2 | RESPIRATION RATE: 16 BRPM | OXYGEN SATURATION: 96 % | WEIGHT: 201 LBS

## 2024-10-30 DIAGNOSIS — Z01.818 PRE-OP EXAMINATION: Primary | ICD-10-CM

## 2024-10-30 DIAGNOSIS — K21.00 GASTROESOPHAGEAL REFLUX DISEASE WITH ESOPHAGITIS WITHOUT HEMORRHAGE: ICD-10-CM

## 2024-10-30 DIAGNOSIS — F40.231 SEVERE NEEDLE PHOBIA: ICD-10-CM

## 2024-10-30 DIAGNOSIS — G47.33 OSA ON CPAP: ICD-10-CM

## 2024-10-30 PROCEDURE — 99214 OFFICE O/P EST MOD 30 MIN: CPT | Performed by: INTERNAL MEDICINE

## 2024-10-30 PROCEDURE — 93000 ELECTROCARDIOGRAM COMPLETE: CPT | Performed by: INTERNAL MEDICINE

## 2024-10-30 NOTE — ASSESSMENT & PLAN NOTE
Medical standpoint he is medically cleared for surgery  Orders:    POCT ECG    Basic metabolic panel; Future    Hemoglobin A1C; Future    CBC and differential; Future

## 2024-10-30 NOTE — PROGRESS NOTES
Pre-operative Clearance  Name: Roni Negron      : 1965      MRN: [id number]  Encounter Provider: Jordyn Simmons MD  Encounter Date: 10/30/2024   [facility]    Assessment & Plan  Pre-op examination  Medical standpoint he is medically cleared for surgery  Orders:    POCT ECG    Basic metabolic panel; Future    Hemoglobin A1C; Future    CBC and differential; Future    LEONIE on CPAP  Continues to use CPAP on a regular basis         Gastroesophageal reflux disease with esophagitis without hemorrhage  Continues on a PPI and H2 blocker         Severe needle phobia         Pre-operative Clearance:     Revised Cardiac Risk Index:  RCI RISK CLASS I (0 risk factors, risk of major cardiac complications approximately 0.5%)    Clearance:  Patient is medically optimized (CLEARED) for proposed surgery without any additional cardiac testing.       Patient is scheduled for dental implants severe needle phobia obstructive sleep apnea and is on CPAP reflux    Medication Instructions:   - Avoid herbs or non-directed vitamins one week prior to surgery    - Avoid aspirin containing medications or non-steroidal anti-inflammatory drugs one week preceding surgery    - May take tylenol for pain up until the night before surgery    - Antidepressants: Continue to take this medication on your normal schedule.  - Benzodiazepines (ie, alprazolam, lorazepam, diazepam):  If the medication is needed, continue to take it on your normal schedule.  - Corticosteroids: Continue to take this medication on your normal schedule.  - H2 Blocker: Continue to take this medication on your normal schedule.      Depression Screening and Follow-up Plan: Patient was screened for depression during today's encounter. They screened negative with a PHQ-9 score of 0.      History of Present Illness     Pre-op Exam  Surgery: dental implants  Surgeon: [facility]    Needs full impants    Previous history of bleeding disorders  or clots?: No  Previous Anesthesia reaction?: No  Prolonged steroid use in the last 6 months?: No    Assessment of Cardiac Risk:   - Unstable or severe angina or MI in the last 6 weeks or history of stent placement in the last year?: No   - Decompensated heart failure (e.g. New onset heart failure, NYHA  Class IV heart failure, or worsening existing heart failure)?: No  - Significant arrhythmias such as high grade AV block, symptomatic ventricular arrhythmia, newly recognized ventricular tachycardia, supraventricular tachycardia with resting heart rate >100, or symptomatic bradycardia?: No  - Severe heart valve disease including aortic stenosis or symptomatic mitral stenosis?: No      Pre-operative Risk Factors:  Elevated-risk surgery: No    History of cerebrovascular disease: No    History of ischemic heart disease: No  Pre-operative treatment with insulin: No  Pre-operative creatinine >2 mg/dL: No    History of congestive heart failure: No    Review of Systems   Constitutional:  Negative for chills, fatigue, fever and unexpected weight change.   HENT:  Negative for congestion, ear pain, hearing loss, postnasal drip, sinus pressure, sore throat, trouble swallowing and voice change.    Eyes:  Negative for visual disturbance.   Respiratory:  Negative for cough, chest tightness, shortness of breath and wheezing.    Cardiovascular:  Negative for chest pain, palpitations and leg swelling.   Gastrointestinal:  Negative for abdominal distention, abdominal pain, anal bleeding, blood in stool, constipation, diarrhea and nausea.   Endocrine: Negative for cold intolerance, polydipsia, polyphagia and polyuria.   Genitourinary:  Negative for dysuria, flank pain, frequency, hematuria and urgency.   Musculoskeletal:  Negative for arthralgias, back pain, gait problem, joint swelling, myalgias and neck pain.   Skin:  Negative for rash.   Allergic/Immunologic: Negative for immunocompromised state.   Neurological:  Negative for  dizziness, syncope, facial asymmetry, weakness, light-headedness, numbness and headaches.   Hematological:  Negative for adenopathy.   Psychiatric/Behavioral:  Negative for confusion, sleep disturbance and suicidal ideas.      Past Medical History   Past Medical History:   Diagnosis Date    GERD (gastroesophageal reflux disease)     LEONIE (obstructive sleep apnea)     Psychiatric disorder      Past Surgical History:   Procedure Laterality Date    APPENDECTOMY      HERNIA REPAIR Right      Family History   Problem Relation Age of Onset    Lung cancer Father     Colon cancer Mother     Cri-du-chat syndrome Brother      Social History     Tobacco Use    Smoking status: Former     Average packs/day: 3.0 packs/day for 28.0 years (84.0 ttl pk-yrs)     Types: Cigarettes, Cigars     Start date: 1984     Passive exposure: Past    Smokeless tobacco: Former     Types: Chew   Vaping Use    Vaping status: Never Used   Substance and Sexual Activity    Alcohol use: Yes     Alcohol/week: 1.0 standard drink of alcohol     Types: 1 Cans of beer per week     Comment: holidays    Drug use: No    Sexual activity: Not Currently     Current Outpatient Medications on File Prior to Visit   Medication Sig    ALPRAZolam (XANAX) 0.25 mg tablet TAKE 2 TABLETS (0.5 MG TOTAL) BY MOUTH 2 (TWO) TIMES A DAY AS NEEDED FOR ANXIETY    esomeprazole (NexIUM) 20 mg capsule Take 40 mg by mouth every morning before breakfast    famotidine (PEPCID) 40 MG tablet Take 1 tablet (40 mg total) by mouth daily at bedtime    sertraline (ZOLOFT) 100 mg tablet take 1 tablet by mouth once daily (NEEDS APPOINTMENT)    EPINEPHrine (EPIPEN) 0.3 mg/0.3 mL SOAJ Inject 0.3 mL (0.3 mg total) into a muscle once for 1 dose As directed    fluticasone (FLONASE) 50 mcg/act nasal spray 1 spray into each nostril daily (Patient not taking: Reported on 2/26/2024)    methylPREDNISolone 4 MG tablet therapy pack use as directed FOLLOW DIRECTIONS ON BACK OF FOIL PACK (Patient not taking:  "Reported on 3/4/2024)     Allergies   Allergen Reactions    White Faced Hornet Venom Hives     Objective     /70 (BP Location: Right arm, Patient Position: Sitting, Cuff Size: Large)   Pulse 82   Temp 98.2 °F (36.8 °C) (Temporal)   Resp 16   Ht 5' 9\" (1.753 m)   Wt 91.2 kg (201 lb)   SpO2 96%   BMI 29.68 kg/m²     Physical Exam  Constitutional:       General: He is not in acute distress.     Appearance: He is well-developed.   HENT:      Right Ear: External ear normal.      Left Ear: External ear normal.      Nose: Nose normal.      Mouth/Throat:      Pharynx: No oropharyngeal exudate.   Eyes:      Pupils: Pupils are equal, round, and reactive to light.   Neck:      Thyroid: No thyromegaly.      Vascular: No JVD.   Cardiovascular:      Rate and Rhythm: Normal rate and regular rhythm.      Heart sounds: Normal heart sounds. No murmur heard.     No gallop.   Pulmonary:      Effort: Pulmonary effort is normal. No respiratory distress.      Breath sounds: Normal breath sounds. No wheezing or rales.   Abdominal:      General: Bowel sounds are normal. There is no distension.      Palpations: Abdomen is soft. There is no mass.      Tenderness: There is no abdominal tenderness.   Musculoskeletal:         General: No tenderness. Normal range of motion.      Cervical back: Normal range of motion and neck supple.   Lymphadenopathy:      Cervical: No cervical adenopathy.   Skin:     Findings: No rash.   Neurological:      Mental Status: He is alert and oriented to person, place, and time.      Cranial Nerves: No cranial nerve deficit.      Coordination: Coordination normal.   Psychiatric:         Behavior: Behavior normal.         Thought Content: Thought content normal.         Judgment: Judgment normal.           Jordyn Simmons MD  "

## 2024-10-30 NOTE — TELEPHONE ENCOUNTER
Please fax lab results for Dental Surgery PreOp once they have been released and reviewed by doctor.    Fax: [phone]

## 2024-11-04 NOTE — TELEPHONE ENCOUNTER
Sage from [facility] called and is requesting lab results for the anesthesiologist to review. Please fax to: [phone]    Sage can be reached at [phone].

## 2024-12-17 ENCOUNTER — APPOINTMENT (OUTPATIENT)
Dept: LAB | Facility: CLINIC | Age: 59
End: 2024-12-17
Payer: COMMERCIAL

## 2024-12-17 DIAGNOSIS — Z01.818 PRE-OP EXAMINATION: ICD-10-CM

## 2024-12-17 LAB
ANION GAP SERPL CALCULATED.3IONS-SCNC: 8 MMOL/L (ref 4–13)
BASOPHILS # BLD AUTO: 0.06 THOUSANDS/ÂΜL (ref 0–0.1)
BASOPHILS NFR BLD AUTO: 1 % (ref 0–1)
BUN SERPL-MCNC: 15 MG/DL (ref 5–25)
CALCIUM SERPL-MCNC: 9.1 MG/DL (ref 8.4–10.2)
CHLORIDE SERPL-SCNC: 103 MMOL/L (ref 96–108)
CO2 SERPL-SCNC: 29 MMOL/L (ref 21–32)
CREAT SERPL-MCNC: 0.88 MG/DL (ref 0.6–1.3)
EOSINOPHIL # BLD AUTO: 0.16 THOUSAND/ÂΜL (ref 0–0.61)
EOSINOPHIL NFR BLD AUTO: 3 % (ref 0–6)
ERYTHROCYTE [DISTWIDTH] IN BLOOD BY AUTOMATED COUNT: 12.8 % (ref 11.6–15.1)
EST. AVERAGE GLUCOSE BLD GHB EST-MCNC: 117 MG/DL
GFR SERPL CREATININE-BSD FRML MDRD: 94 ML/MIN/1.73SQ M
GLUCOSE P FAST SERPL-MCNC: 116 MG/DL (ref 65–99)
HBA1C MFR BLD: 5.7 %
HCT VFR BLD AUTO: 50.9 % (ref 36.5–49.3)
HGB BLD-MCNC: 16.4 G/DL (ref 12–17)
IMM GRANULOCYTES # BLD AUTO: 0.04 THOUSAND/UL (ref 0–0.2)
IMM GRANULOCYTES NFR BLD AUTO: 1 % (ref 0–2)
LYMPHOCYTES # BLD AUTO: 1.92 THOUSANDS/ÂΜL (ref 0.6–4.47)
LYMPHOCYTES NFR BLD AUTO: 33 % (ref 14–44)
MCH RBC QN AUTO: 29.8 PG (ref 26.8–34.3)
MCHC RBC AUTO-ENTMCNC: 32.2 G/DL (ref 31.4–37.4)
MCV RBC AUTO: 93 FL (ref 82–98)
MONOCYTES # BLD AUTO: 0.63 THOUSAND/ÂΜL (ref 0.17–1.22)
MONOCYTES NFR BLD AUTO: 11 % (ref 4–12)
NEUTROPHILS # BLD AUTO: 3.09 THOUSANDS/ÂΜL (ref 1.85–7.62)
NEUTS SEG NFR BLD AUTO: 51 % (ref 43–75)
NRBC BLD AUTO-RTO: 0 /100 WBCS
PLATELET # BLD AUTO: 108 THOUSANDS/UL (ref 149–390)
PMV BLD AUTO: 11.5 FL (ref 8.9–12.7)
POTASSIUM SERPL-SCNC: 3.9 MMOL/L (ref 3.5–5.3)
RBC # BLD AUTO: 5.5 MILLION/UL (ref 3.88–5.62)
SODIUM SERPL-SCNC: 140 MMOL/L (ref 135–147)
WBC # BLD AUTO: 5.9 THOUSAND/UL (ref 4.31–10.16)

## 2024-12-17 PROCEDURE — 36415 COLL VENOUS BLD VENIPUNCTURE: CPT

## 2024-12-17 PROCEDURE — 80048 BASIC METABOLIC PNL TOTAL CA: CPT

## 2024-12-17 PROCEDURE — 85025 COMPLETE CBC W/AUTO DIFF WBC: CPT

## 2024-12-17 PROCEDURE — 83036 HEMOGLOBIN GLYCOSYLATED A1C: CPT

## 2025-03-19 ENCOUNTER — TELEPHONE (OUTPATIENT)
Age: 60
End: 2025-03-19

## 2025-04-21 DIAGNOSIS — M25.50 ARTHRALGIA, UNSPECIFIED JOINT: Primary | ICD-10-CM

## 2025-04-22 DIAGNOSIS — G89.29 CHRONIC ARTHRALGIAS OF KNEES AND HIPS: Primary | ICD-10-CM

## 2025-04-22 DIAGNOSIS — M25.551 CHRONIC ARTHRALGIAS OF KNEES AND HIPS: Primary | ICD-10-CM

## 2025-04-22 DIAGNOSIS — M25.561 CHRONIC ARTHRALGIAS OF KNEES AND HIPS: Primary | ICD-10-CM

## 2025-04-22 DIAGNOSIS — M25.552 CHRONIC ARTHRALGIAS OF KNEES AND HIPS: Primary | ICD-10-CM

## 2025-04-22 DIAGNOSIS — M25.562 CHRONIC ARTHRALGIAS OF KNEES AND HIPS: Primary | ICD-10-CM

## 2025-04-23 ENCOUNTER — APPOINTMENT (OUTPATIENT)
Dept: LAB | Facility: CLINIC | Age: 60
End: 2025-04-23
Payer: COMMERCIAL

## 2025-04-23 DIAGNOSIS — M25.50 ARTHRALGIA, UNSPECIFIED JOINT: ICD-10-CM

## 2025-04-23 PROCEDURE — 36415 COLL VENOUS BLD VENIPUNCTURE: CPT

## 2025-04-23 PROCEDURE — 86618 LYME DISEASE ANTIBODY: CPT

## 2025-04-24 ENCOUNTER — RESULTS FOLLOW-UP (OUTPATIENT)
Dept: FAMILY MEDICINE CLINIC | Facility: CLINIC | Age: 60
End: 2025-04-24

## 2025-04-24 LAB — B BURGDOR IGG+IGM SER QL IA: NEGATIVE

## 2025-04-29 ENCOUNTER — TELEPHONE (OUTPATIENT)
Age: 60
End: 2025-04-29

## 2025-04-29 NOTE — TELEPHONE ENCOUNTER
Spoke with pt regarding reschedule sleep med appt needed    pt was provided with sleep med number of [phone] to reschedule   please reschedule pt

## 2025-04-30 ENCOUNTER — APPOINTMENT (OUTPATIENT)
Dept: RADIOLOGY | Facility: MEDICAL CENTER | Age: 60
End: 2025-04-30
Payer: COMMERCIAL

## 2025-04-30 ENCOUNTER — OFFICE VISIT (OUTPATIENT)
Dept: FAMILY MEDICINE CLINIC | Facility: CLINIC | Age: 60
End: 2025-04-30
Payer: COMMERCIAL

## 2025-04-30 VITALS
SYSTOLIC BLOOD PRESSURE: 112 MMHG | BODY MASS INDEX: 29.1 KG/M2 | OXYGEN SATURATION: 98 % | TEMPERATURE: 98 F | HEIGHT: 68 IN | RESPIRATION RATE: 16 BRPM | WEIGHT: 192 LBS | HEART RATE: 87 BPM | DIASTOLIC BLOOD PRESSURE: 82 MMHG

## 2025-04-30 DIAGNOSIS — M79.671 FOOT PAIN, BILATERAL: Primary | ICD-10-CM

## 2025-04-30 DIAGNOSIS — N13.8 BPH WITH OBSTRUCTION/LOWER URINARY TRACT SYMPTOMS: ICD-10-CM

## 2025-04-30 DIAGNOSIS — N40.1 BPH WITH OBSTRUCTION/LOWER URINARY TRACT SYMPTOMS: ICD-10-CM

## 2025-04-30 DIAGNOSIS — G89.29 CHRONIC PAIN OF BOTH KNEES: ICD-10-CM

## 2025-04-30 DIAGNOSIS — Z00.00 ANNUAL PHYSICAL EXAM: ICD-10-CM

## 2025-04-30 DIAGNOSIS — M25.562 CHRONIC PAIN OF BOTH KNEES: ICD-10-CM

## 2025-04-30 DIAGNOSIS — M79.672 FOOT PAIN, BILATERAL: Primary | ICD-10-CM

## 2025-04-30 DIAGNOSIS — M25.50 ARTHRALGIA, UNSPECIFIED JOINT: ICD-10-CM

## 2025-04-30 DIAGNOSIS — M25.561 CHRONIC PAIN OF BOTH KNEES: ICD-10-CM

## 2025-04-30 DIAGNOSIS — F32.A DEPRESSION, UNSPECIFIED DEPRESSION TYPE: ICD-10-CM

## 2025-04-30 PROCEDURE — 73562 X-RAY EXAM OF KNEE 3: CPT

## 2025-04-30 PROCEDURE — 99396 PREV VISIT EST AGE 40-64: CPT | Performed by: INTERNAL MEDICINE

## 2025-04-30 PROCEDURE — 99214 OFFICE O/P EST MOD 30 MIN: CPT | Performed by: INTERNAL MEDICINE

## 2025-04-30 RX ORDER — SERTRALINE HYDROCHLORIDE 100 MG/1
100 TABLET, FILM COATED ORAL 2 TIMES DAILY
Qty: 90 TABLET | Refills: 3 | Status: SHIPPED | OUTPATIENT
Start: 2025-04-30

## 2025-04-30 NOTE — PROGRESS NOTES
Adult Annual Physical  Name: Roni Negron      : 1965      MRN: [id number]  Encounter Provider: Jordyn Simmons MD  Encounter Date: 2025   Encounter department: [facility]    :  Assessment & Plan  Depression, unspecified depression type  Depression Screening Follow-up Plan: Patient's depression screening was positive with a PHQ-9 score of 6. Patient assessed for underlying major depression. They have no active suicidal ideations. Brief counseling provided and recommend additional follow-up/re-evaluation next office visit.  Patient has had ongoing anxiety and depression for quite a while and is on Zoloft 100 but he obviously needs more so we will try increasing it to 200       Foot pain, bilateral  He does complain of bilateral foot pain which sounds like arthritis or arch problems he has never seen a podiatrist so I sent him to 1  Orders:    Ambulatory Referral to Podiatry; Future    Hemoglobin A1C; Future    Arthralgia, unspecified joint  Generally complains of diffuse arthritis although he has always worked a markedly physical job.  Will get a basic screen for rheumatological disorders  Orders:    RHEUMATOID FACTOR; Future    TRAN Screen w/Reflex [place]; Future    Comprehensive metabolic panel; Future    Chronic pain of both knees    Orders:    XR knee 3 vw right non injury; Future    BPH with obstruction/lower urinary tract symptoms  He also has starting to develop BPH symptoms although he does not want admitted so we will get a PSA  Orders:    PSA, Total Screen; Future    Annual physical exam             Preventive Screenings:  - Diabetes Screening: screening up-to-date  - Cholesterol Screening: has hyperlipidemia   - Hepatitis C screening: screening not indicated   - HIV screening: screening not indicated   - Colon cancer screening: screening up-to-date     Immunizations:  - Immunizations due: Influenza, Tdap and Zoster (Shingrix)    Counseling/Anticipatory Guidance:    - Diet:  discussed recommendations for a healthy/well-balanced diet.   - Exercise: the importance of regular exercise/physical activity was discussed. Recommend exercise 3-5 times per week for at least 30 minutes.   - Injury prevention: discussed safety/seat belts, safety helmets, smoke detectors, carbon monoxide detectors, and smoking near bedding or upholstery.       Depression Screening and Follow-up Plan: Patient's depression screening was positive with a PHQ-9 score of 6.   Patient declines further evaluation by mental health professional and/or medications. Brief counseling provided. Will re-evaluate at next office visit.         History of Present Illness     Adult Annual Physical:  Patient presents for annual physical. Patient comes in today complaining of aching all over.  He has had longstanding arthritis and always worked a physical job.  And he is very resistant on taking any medication for anything including his anxiety and depression meds.     Diet and Physical Activity:  - Diet/Nutrition: no special diet and limited junk food. Limit hot spicy foods  - Exercise: no formal exercise.    Depression Screening:    - PHQ-9 Score: 6    General Health:  - Sleep: sleeps poorly, > 8 hours of sleep on average and uses CPAP machine.  - Hearing: decreased hearing right ear, decreased hearing left ear, decreased hearing bilateral ears, tinnitus and requires use of hearing aids.  - Vision: vision problems, most recent eye exam < 1 year ago and wears glasses.  - Dental: brushes teeth twice daily and floss regularly. Full dental implants top and bottom    /GYN Health:  - Follows with GYN: no.   - History of STDs: no     Health:  - History of STDs: no.   - Urinary symptoms: urinary frequency.     Advanced Care Planning:  - Has an advanced directive?: no    - Has a durable medical POA?: no      Review of Systems   Constitutional:  Positive for fatigue. Negative for chills and fever.   HENT:  Negative for ear pain and sore  "throat.    Eyes:  Negative for pain and visual disturbance.   Respiratory:  Negative for cough and shortness of breath.    Cardiovascular:  Negative for chest pain and palpitations.   Gastrointestinal:  Negative for abdominal pain and vomiting.   Genitourinary:  Positive for difficulty urinating. Negative for dysuria and hematuria.   Musculoskeletal:  Positive for arthralgias and back pain.   Skin:  Negative for color change and rash.   Neurological:  Negative for seizures and syncope.   Psychiatric/Behavioral:  Positive for dysphoric mood and sleep disturbance. The patient is nervous/anxious.    All other systems reviewed and are negative.        Objective   /82 (BP Location: Left arm, Patient Position: Sitting, Cuff Size: Large)   Pulse 87   Temp 98 °F (36.7 °C) (Temporal)   Resp 16   Ht 5' 7.58\" (1.717 m)   Wt 87.1 kg (192 lb)   SpO2 98%   BMI 29.56 kg/m²     Physical Exam  Constitutional:       Appearance: Normal appearance.   HENT:      Right Ear: Tympanic membrane normal.      Left Ear: Tympanic membrane normal.      Nose: Nose normal.      Mouth/Throat:      Mouth: Mucous membranes are moist.      Pharynx: Oropharynx is clear.   Eyes:      Extraocular Movements: Extraocular movements intact.      Pupils: Pupils are equal, round, and reactive to light.   Neck:      Vascular: No carotid bruit.   Cardiovascular:      Rate and Rhythm: Normal rate and regular rhythm.      Pulses: Normal pulses.      Heart sounds: Normal heart sounds.   Pulmonary:      Effort: Pulmonary effort is normal.      Breath sounds: Normal breath sounds.   Abdominal:      General: Abdomen is flat.      Palpations: Abdomen is soft. There is no mass.      Tenderness: There is no abdominal tenderness.      Hernia: No hernia is present.   Musculoskeletal:         General: Normal range of motion.      Cervical back: Normal range of motion and neck supple.      Right lower leg: No edema.      Left lower leg: No edema. "   Lymphadenopathy:      Cervical: No cervical adenopathy.   Skin:     General: Skin is warm and dry.      Findings: No lesion or rash.   Neurological:      General: No focal deficit present.      Mental Status: He is alert and oriented to person, place, and time. Mental status is at baseline.      Cranial Nerves: No cranial nerve deficit.      Sensory: No sensory deficit.      Motor: No weakness.      Gait: Gait normal.      Deep Tendon Reflexes: Reflexes normal.

## 2025-04-30 NOTE — ASSESSMENT & PLAN NOTE
Depression Screening Follow-up Plan: Patient's depression screening was positive with a PHQ-9 score of 6. Patient assessed for underlying major depression. They have no active suicidal ideations. Brief counseling provided and recommend additional follow-up/re-evaluation next office visit.  Patient has had ongoing anxiety and depression for quite a while and is on Zoloft 100 but he obviously needs more so we will try increasing it to 200

## 2025-05-01 ENCOUNTER — APPOINTMENT (OUTPATIENT)
Dept: LAB | Facility: CLINIC | Age: 60
End: 2025-05-01
Payer: COMMERCIAL

## 2025-05-01 DIAGNOSIS — N40.1 BPH WITH OBSTRUCTION/LOWER URINARY TRACT SYMPTOMS: ICD-10-CM

## 2025-05-01 DIAGNOSIS — M25.50 ARTHRALGIA, UNSPECIFIED JOINT: ICD-10-CM

## 2025-05-01 DIAGNOSIS — M79.672 FOOT PAIN, BILATERAL: ICD-10-CM

## 2025-05-01 DIAGNOSIS — N13.8 BPH WITH OBSTRUCTION/LOWER URINARY TRACT SYMPTOMS: ICD-10-CM

## 2025-05-01 DIAGNOSIS — M79.671 FOOT PAIN, BILATERAL: ICD-10-CM

## 2025-05-01 LAB
ALBUMIN SERPL BCG-MCNC: 4.4 G/DL (ref 3.5–5)
ALP SERPL-CCNC: 59 U/L (ref 34–104)
ALT SERPL W P-5'-P-CCNC: 36 U/L (ref 7–52)
ANION GAP SERPL CALCULATED.3IONS-SCNC: 5 MMOL/L (ref 4–13)
AST SERPL W P-5'-P-CCNC: 18 U/L (ref 13–39)
BILIRUB SERPL-MCNC: 0.49 MG/DL (ref 0.2–1)
BUN SERPL-MCNC: 18 MG/DL (ref 5–25)
CALCIUM SERPL-MCNC: 9.7 MG/DL (ref 8.4–10.2)
CHLORIDE SERPL-SCNC: 105 MMOL/L (ref 96–108)
CO2 SERPL-SCNC: 33 MMOL/L (ref 21–32)
CREAT SERPL-MCNC: 1 MG/DL (ref 0.6–1.3)
EST. AVERAGE GLUCOSE BLD GHB EST-MCNC: 123 MG/DL
GFR SERPL CREATININE-BSD FRML MDRD: 82 ML/MIN/1.73SQ M
GLUCOSE P FAST SERPL-MCNC: 109 MG/DL (ref 65–99)
HBA1C MFR BLD: 5.9 %
POTASSIUM SERPL-SCNC: 5.3 MMOL/L (ref 3.5–5.3)
PROT SERPL-MCNC: 6.3 G/DL (ref 6.4–8.4)
PSA SERPL-MCNC: 1.94 NG/ML (ref 0–4)
RHEUMATOID FACT SERPL-ACNC: <10 IU/ML
SODIUM SERPL-SCNC: 143 MMOL/L (ref 135–147)

## 2025-05-01 PROCEDURE — 80053 COMPREHEN METABOLIC PANEL: CPT

## 2025-05-01 PROCEDURE — 86225 DNA ANTIBODY NATIVE: CPT

## 2025-05-01 PROCEDURE — 86431 RHEUMATOID FACTOR QUANT: CPT

## 2025-05-01 PROCEDURE — 83036 HEMOGLOBIN GLYCOSYLATED A1C: CPT

## 2025-05-01 PROCEDURE — G0103 PSA SCREENING: HCPCS

## 2025-05-01 PROCEDURE — 86038 ANTINUCLEAR ANTIBODIES: CPT

## 2025-05-01 PROCEDURE — 36415 COLL VENOUS BLD VENIPUNCTURE: CPT

## 2025-05-02 ENCOUNTER — RESULTS FOLLOW-UP (OUTPATIENT)
Dept: FAMILY MEDICINE CLINIC | Facility: CLINIC | Age: 60
End: 2025-05-02

## 2025-05-02 NOTE — TELEPHONE ENCOUNTER
A. Relayed results to (patient/patient representative as listed on communication consent form) as per provider message. Patient/Patient Representative expressed understanding and did not have any further questions.                                                               [email or web address]

## 2025-05-02 NOTE — TELEPHONE ENCOUNTER
----- Message from Jordyn Simmons MD sent at 5/2/2025  8:35 AM EDT -----  His A1c remains under 6 at 5.9

## 2025-05-03 LAB
DSDNA IGG SERPL IA-ACNC: 2.8 IU/ML (ref ?–15)
NUCLEAR IGG SER IA-RTO: <0.09 RATIO (ref ?–1)

## 2025-05-08 ENCOUNTER — OFFICE VISIT (OUTPATIENT)
Age: 60
End: 2025-05-08
Attending: INTERNAL MEDICINE
Payer: COMMERCIAL

## 2025-05-08 VITALS — BODY MASS INDEX: 29.1 KG/M2 | HEIGHT: 68 IN | WEIGHT: 192 LBS

## 2025-05-08 DIAGNOSIS — M79.671 RIGHT FOOT PAIN: Primary | ICD-10-CM

## 2025-05-08 DIAGNOSIS — M76.71 PERONEAL TENDINITIS OF RIGHT LOWER EXTREMITY: ICD-10-CM

## 2025-05-08 DIAGNOSIS — M79.672 FOOT PAIN, BILATERAL: ICD-10-CM

## 2025-05-08 DIAGNOSIS — M79.671 FOOT PAIN, BILATERAL: ICD-10-CM

## 2025-05-08 PROCEDURE — 99203 OFFICE O/P NEW LOW 30 MIN: CPT

## 2025-05-18 NOTE — PROGRESS NOTES
"Name: Roni Negron      : 1965      MRN: [id number]  Encounter Provider: Angel Melendrez DPM  Encounter Date: 2025   Encounter department: [facility]  :  Assessment & Plan  Foot pain, bilateral    Orders:    Ambulatory Referral to Podiatry    Right foot pain    Orders:    XR foot 3+ vw right; Future    Ambulatory Referral to Physical Therapy; Future    Peroneal tendinitis of right lower extremity    Orders:    Ambulatory Referral to Physical Therapy; Future      -The patient and I thoroughly discussed their ankle pain  -Recommend Voltaren gel to be applied 4 times daily  -Recommend purchase of supportive sneakers with supportive over-the-counter orthotics  -Recommend limiting exercise for now  -Return to clinic 4 weeks, will consider formal course of physical therapy at this time if no improvements are made    X-ray of the right foot from 2025 interpreted independently: No acute osseous abnormality noted.  No abnormalities noted within the soft tissues.      History of Present Illness   HPI  Roni Negron is a 60 y.o. male who presents with pain on outside right foot. Ongoing for past couple weeks, denies any weakness of dysfunction in foot.    History obtained from: patient    Review of Systems  Medications Ordered Prior to Encounter[1]      Objective   Ht 5' 7.58\" (1.717 m)   Wt 87.1 kg (192 lb)   BMI 29.56 kg/m²      Physical Exam    Vascular:  -DP and PT pulses intact b/l  -Capillary refill time <2 sec b/l  -Digital hair growth: Present  -Skin temp: WNL    MSK:  -Pain on palpation to the right ankle just inferior to the lateral malleolus continuing distally towards the fifth metatarsal base at the insertion site of the peroneus brevis  -Pain with plantarflexion and eversion against resistance  -No gross deformities noted   -MMT is 5/5 to all muscle compartments of the lower extremity  -Ankle dorsiflexion >10 degrees with knee extended and knee flexed " b/l    Neuro:  -Light sensation intact bilaterally  -Protective sensation intact bilaterally    Derm:  -No lesions, abrasions, or open wounds noted  -No noted interdigital maceration, peeling, malodor  -No callus formation noted on exam       [1]   Current Outpatient Medications on File Prior to Visit   Medication Sig Dispense Refill    ALPRAZolam (XANAX) 0.25 mg tablet TAKE 2 TABLETS (0.5 MG TOTAL) BY MOUTH 2 (TWO) TIMES A DAY AS NEEDED FOR ANXIETY 60 tablet 0    esomeprazole (NexIUM) 20 mg capsule Take 40 mg by mouth every morning before breakfast      famotidine (PEPCID) 40 MG tablet Take 1 tablet (40 mg total) by mouth daily at bedtime 90 tablet 1    sertraline (ZOLOFT) 100 mg tablet Take 1 tablet (100 mg total) by mouth 2 (two) times a day 90 tablet 3    EPINEPHrine (EPIPEN) 0.3 mg/0.3 mL SOAJ Inject 0.3 mL (0.3 mg total) into a muscle once for 1 dose As directed 1 each 1    fluticasone (FLONASE) 50 mcg/act nasal spray 1 spray into each nostril daily (Patient not taking: Reported on 2/26/2024) 9.9 mL 0     No current facility-administered medications on file prior to visit.

## 2025-06-04 ENCOUNTER — TELEPHONE (OUTPATIENT)
Age: 60
End: 2025-06-04

## 2025-06-04 DIAGNOSIS — M79.671 FOOT PAIN, RIGHT: ICD-10-CM

## 2025-06-04 DIAGNOSIS — M25.561 RIGHT KNEE PAIN, UNSPECIFIED CHRONICITY: Primary | ICD-10-CM

## 2025-06-17 ENCOUNTER — OFFICE VISIT (OUTPATIENT)
Dept: OBGYN CLINIC | Facility: CLINIC | Age: 60
End: 2025-06-17
Attending: INTERNAL MEDICINE
Payer: COMMERCIAL

## 2025-06-17 VITALS — WEIGHT: 194 LBS | HEIGHT: 68 IN | BODY MASS INDEX: 29.4 KG/M2

## 2025-06-17 DIAGNOSIS — M62.9 HAMSTRING TIGHTNESS OF BOTH LOWER EXTREMITIES: ICD-10-CM

## 2025-06-17 DIAGNOSIS — M22.2X1 PATELLOFEMORAL SYNDROME OF RIGHT KNEE: ICD-10-CM

## 2025-06-17 DIAGNOSIS — R29.898 WEAKNESS OF BOTH HIPS: ICD-10-CM

## 2025-06-17 DIAGNOSIS — M17.11 PRIMARY OSTEOARTHRITIS OF RIGHT KNEE: Primary | ICD-10-CM

## 2025-06-17 PROCEDURE — 99204 OFFICE O/P NEW MOD 45 MIN: CPT | Performed by: FAMILY MEDICINE

## 2025-06-17 RX ORDER — MELOXICAM 15 MG/1
15 TABLET ORAL DAILY
Qty: 30 TABLET | Refills: 1 | Status: SHIPPED | OUTPATIENT
Start: 2025-06-17

## 2025-06-17 NOTE — PROGRESS NOTES
Name: Roni Negron      : 1965      MRN: [id number]  Encounter Provider: Han Roman DO  Encounter Date: 2025   Encounter department: [facility] [place]  :  Assessment & Plan  Primary osteoarthritis of right knee  60-year-old active male with right knee pain and stiffness more than 2 months duration.  X-rays of the right knee noted for mild degenerative changes without acute osseous abnormality.  Clinical impression is that he has symptoms from combination of aggravated arthritis and abnormal patellofemoral mechanics (kneecap being off alignment with thigh bone).  I discussed treatment regimen of anti-inflammatory, supplements, supportive wear, and formal therapy.  Surgery is not warranted.  He would like to hold off from injection at this time.  He is to take meloxicam 15 mg once daily with food for 30 days and not to take ibuprofen or Aleve, but may take Tylenol.  Start taking turmeric, tart cherry, and glucosamine supplements.  I provided patellar stabilizing brace to wear during physical activity.  Start physical therapy as soon as possible and do home exercises as directed.  Follow-up if symptoms worsen or if no improvement with at least 1 month of formal therapy.  Orders:    Ambulatory Referral to Orthopedic Surgery    meloxicam (MOBIC) 15 mg tablet; Take 1 tablet (15 mg total) by mouth daily    Ambulatory Referral to Physical Therapy; Future    Patellofemoral syndrome of right knee  60-year-old active male with right knee pain and stiffness more than 2 months duration.  X-rays of the right knee noted for mild degenerative changes without acute osseous abnormality.  Clinical impression is that he has symptoms from combination of aggravated arthritis and abnormal patellofemoral mechanics (kneecap being off alignment with thigh bone).  I discussed treatment regimen of anti-inflammatory, supplements, supportive wear, and formal  therapy.  Surgery is not warranted.  He would like to hold off from injection at this time.  He is to take meloxicam 15 mg once daily with food for 30 days and not to take ibuprofen or Aleve, but may take Tylenol.  Start taking turmeric, tart cherry, and glucosamine supplements.  I provided patellar stabilizing brace to wear during physical activity.  Start physical therapy as soon as possible and do home exercises as directed.  Follow-up if symptoms worsen or if no improvement with at least 1 month of formal therapy.  Orders:    meloxicam (MOBIC) 15 mg tablet; Take 1 tablet (15 mg total) by mouth daily    Ambulatory Referral to Physical Therapy; Future    Brace    Weakness of both hips    Orders:    Ambulatory Referral to Physical Therapy; Future    Hamstring tightness of both lower extremities    Orders:    Ambulatory Referral to Physical Therapy; Future        History of Present Illness   Chief Complaint   Patient presents with    Right Knee - Pain, Locking      HPI  Roni Negron is a 60 y.o. male who presents for evaluation right knee pain and stiffness more than 2 months duration.  He denies trauma or inciting event.  He reports initially dealing with issues of the right foot and assessed to have plantar fasciitis.  Right foot has since been treated with purchasing and wearing new boots and also having orthotics/inserts.  Right knee pain has since been bothersome.  He has pain described as generalized to the anterior aspect of the knee, throbbing and burning and sometimes sharp, worse when rising with prolonged sitting, worse with kneeling and squatting, associated with swelling, bothersome at nighttime when laying in certain positions, and improved with changing position.  Long car rides are bothersome.  He sometimes takes ibuprofen to help with symptoms.  Symptoms persisted and he was referred to orthopedic care by primary care physician.    History obtained from: patient and patient's Significant  "Other    Review of Systems   Musculoskeletal:  Positive for arthralgias and joint swelling.   Neurological:  Negative for weakness and numbness.          Objective   Ht 5' 7.58\" (1.717 m)   Wt 88 kg (194 lb)   BMI 29.87 kg/m²      Physical Exam  Vitals and nursing note reviewed.   Constitutional:       Appearance: Normal appearance. He is well-developed. He is not ill-appearing or diaphoretic.   HENT:      Head: Normocephalic and atraumatic.      Right Ear: External ear normal.      Left Ear: External ear normal.     Eyes:      General:         Right eye: No discharge.         Left eye: No discharge.     Pulmonary:      Effort: Pulmonary effort is normal. No respiratory distress.   Abdominal:      General: There is no distension.     Musculoskeletal:         General: Tenderness present.      Right knee:      Instability Tests: Medial Tulio test negative and lateral Tulio test negative.     Skin:     General: Skin is warm and dry.      Coloration: Skin is not jaundiced or pale.     Neurological:      Mental Status: He is alert and oriented to person, place, and time.     Psychiatric:         Mood and Affect: Mood normal.         Behavior: Behavior normal.         Thought Content: Thought content normal.         Judgment: Judgment normal.       Right Ankle Exam     Muscle Strength   Dorsiflexion:  5/5  Plantar flexion:  5/5       Left Ankle Exam     Muscle Strength   Dorsiflexion:  5/5       Right Knee Exam     Muscle Strength   The patient has normal right knee strength.    Tenderness   Right knee tenderness location: Patellar facet.    Range of Motion   Extension:  normal   Flexion:  120     Tests   Tulio:  Medial - negative Lateral - negative  Varus: negative Valgus: negative    Other   Swelling: mild    Comments:  Positive patella inhibition and grind      Right Hip Exam     Muscle Strength   Abduction: 4/5   Flexion: 4/5     Tests   DOUG: negative    Comments:  Negative FADDIR  Hamstring " tightness      Left Hip Exam     Muscle Strength   Abduction: 4/5   Flexion: 4/5     Tests   DOUG: negative    Comments:  Negative FADDIR  Hamstring tightness               I have personally reviewed pertinent films in PACS and my interpretation is  .   X-rays of the right knee noted for mild degenerative changes without acute osseous abnormality.    Administrative Statements   I have spent a total time of 40 minutes in caring for this patient on the day of the visit/encounter including Diagnostic results, Prognosis, Risks and benefits of tx options, Instructions for management, Patient and family education, Impressions, Documenting in the medical record, Reviewing/placing orders in the medical record (including tests, medications, and/or procedures), and Obtaining or reviewing history  .

## 2025-06-17 NOTE — ASSESSMENT & PLAN NOTE
60-year-old active male with right knee pain and stiffness more than 2 months duration.  X-rays of the right knee noted for mild degenerative changes without acute osseous abnormality.  Clinical impression is that he has symptoms from combination of aggravated arthritis and abnormal patellofemoral mechanics (kneecap being off alignment with thigh bone).  I discussed treatment regimen of anti-inflammatory, supplements, supportive wear, and formal therapy.  Surgery is not warranted.  He would like to hold off from injection at this time.  He is to take meloxicam 15 mg once daily with food for 30 days and not to take ibuprofen or Aleve, but may take Tylenol.  Start taking turmeric, tart cherry, and glucosamine supplements.  I provided patellar stabilizing brace to wear during physical activity.  Start physical therapy as soon as possible and do home exercises as directed.  Follow-up if symptoms worsen or if no improvement with at least 1 month of formal therapy.  Orders:    Ambulatory Referral to Orthopedic Surgery    meloxicam (MOBIC) 15 mg tablet; Take 1 tablet (15 mg total) by mouth daily    Ambulatory Referral to Physical Therapy; Future

## 2025-06-17 NOTE — PATIENT INSTRUCTIONS
Over the counter vitamins:    - Turmeric vitamin at least 1000 mg daily    - Tart cherry vitamin at least 1000 mg daily    - Glucosamine-chondrointin 2-3 times daily    Knee brace during physical therapy    Rx: Meloxicam 15 mg  - once daily  - eat first  - EVERYDAY  - 30 days  - no ibuprofen  - no aleve  - tylenol is ok    Physical Therapy and home exercises

## 2025-06-17 NOTE — ASSESSMENT & PLAN NOTE
60-year-old active male with right knee pain and stiffness more than 2 months duration.  X-rays of the right knee noted for mild degenerative changes without acute osseous abnormality.  Clinical impression is that he has symptoms from combination of aggravated arthritis and abnormal patellofemoral mechanics (kneecap being off alignment with thigh bone).  I discussed treatment regimen of anti-inflammatory, supplements, supportive wear, and formal therapy.  Surgery is not warranted.  He would like to hold off from injection at this time.  He is to take meloxicam 15 mg once daily with food for 30 days and not to take ibuprofen or Aleve, but may take Tylenol.  Start taking turmeric, tart cherry, and glucosamine supplements.  I provided patellar stabilizing brace to wear during physical activity.  Start physical therapy as soon as possible and do home exercises as directed.  Follow-up if symptoms worsen or if no improvement with at least 1 month of formal therapy.  Orders:    meloxicam (MOBIC) 15 mg tablet; Take 1 tablet (15 mg total) by mouth daily    Ambulatory Referral to Physical Therapy; Future    Brace

## 2025-06-17 NOTE — LETTER
2025     Jordyn Simmons MD  [address] [facility]  [address] [place] [id number]    Patient: Roni Negron   YOB: 1965   Date of Visit: 2025       Dear Dr. Jordyn Simmons MD:    Thank you for referring Roni Negron to me for evaluation. Below are my notes for this consultation.    If you have questions, please do not hesitate to call me. I look forward to following your patient along with you.         Sincerely,        Han Roman DO        CC: No Recipients    Han Roman DO  2025 11:50 AM  Sign when Signing Visit  Name: Roni Negron      : 1965      MRN: [id number]  Encounter Provider: Han Roman DO  Encounter Date: 2025   Encounter department: [facility] [place]  :  Assessment & Plan  Primary osteoarthritis of right knee  60-year-old active male with right knee pain and stiffness more than 2 months duration.  X-rays of the right knee noted for mild degenerative changes without acute osseous abnormality.  Clinical impression is that he has symptoms from combination of aggravated arthritis and abnormal patellofemoral mechanics (kneecap being off alignment with thigh bone).  I discussed treatment regimen of anti-inflammatory, supplements, supportive wear, and formal therapy.  Surgery is not warranted.  He would like to hold off from injection at this time.  He is to take meloxicam 15 mg once daily with food for 30 days and not to take ibuprofen or Aleve, but may take Tylenol.  Start taking turmeric, tart cherry, and glucosamine supplements.  I provided patellar stabilizing brace to wear during physical activity.  Start physical therapy as soon as possible and do home exercises as directed.  Follow-up if symptoms worsen or if no improvement with at least 1 month of formal therapy.  Orders:  •  Ambulatory Referral to Orthopedic Surgery  •  meloxicam (MOBIC) 15 mg  tablet; Take 1 tablet (15 mg total) by mouth daily  •  Ambulatory Referral to Physical Therapy; Future    Patellofemoral syndrome of right knee  60-year-old active male with right knee pain and stiffness more than 2 months duration.  X-rays of the right knee noted for mild degenerative changes without acute osseous abnormality.  Clinical impression is that he has symptoms from combination of aggravated arthritis and abnormal patellofemoral mechanics (kneecap being off alignment with thigh bone).  I discussed treatment regimen of anti-inflammatory, supplements, supportive wear, and formal therapy.  Surgery is not warranted.  He would like to hold off from injection at this time.  He is to take meloxicam 15 mg once daily with food for 30 days and not to take ibuprofen or Aleve, but may take Tylenol.  Start taking turmeric, tart cherry, and glucosamine supplements.  I provided patellar stabilizing brace to wear during physical activity.  Start physical therapy as soon as possible and do home exercises as directed.  Follow-up if symptoms worsen or if no improvement with at least 1 month of formal therapy.  Orders:  •  meloxicam (MOBIC) 15 mg tablet; Take 1 tablet (15 mg total) by mouth daily  •  Ambulatory Referral to Physical Therapy; Future  •  Brace    Weakness of both hips    Orders:  •  Ambulatory Referral to Physical Therapy; Future    Hamstring tightness of both lower extremities    Orders:  •  Ambulatory Referral to Physical Therapy; Future        History of Present Illness  Chief Complaint   Patient presents with   • Right Knee - Pain, Locking      HPI  Roni Negron is a 60 y.o. male who presents for evaluation right knee pain and stiffness more than 2 months duration.  He denies trauma or inciting event.  He reports initially dealing with issues of the right foot and assessed to have plantar fasciitis.  Right foot has since been treated with purchasing and wearing new boots and also having  "orthotics/inserts.  Right knee pain has since been bothersome.  He has pain described as generalized to the anterior aspect of the knee, throbbing and burning and sometimes sharp, worse when rising with prolonged sitting, worse with kneeling and squatting, associated with swelling, bothersome at nighttime when laying in certain positions, and improved with changing position.  Long car rides are bothersome.  He sometimes takes ibuprofen to help with symptoms.  Symptoms persisted and he was referred to orthopedic care by primary care physician.    History obtained from: patient and patient's Significant Other    Review of Systems   Musculoskeletal:  Positive for arthralgias and joint swelling.   Neurological:  Negative for weakness and numbness.          Objective  Ht 5' 7.58\" (1.717 m)   Wt 88 kg (194 lb)   BMI 29.87 kg/m²      Physical Exam  Vitals and nursing note reviewed.   Constitutional:       Appearance: Normal appearance. He is well-developed. He is not ill-appearing or diaphoretic.   HENT:      Head: Normocephalic and atraumatic.      Right Ear: External ear normal.      Left Ear: External ear normal.     Eyes:      General:         Right eye: No discharge.         Left eye: No discharge.     Pulmonary:      Effort: Pulmonary effort is normal. No respiratory distress.   Abdominal:      General: There is no distension.     Musculoskeletal:         General: Tenderness present.      Right knee:      Instability Tests: Medial Tulio test negative and lateral Tulio test negative.     Skin:     General: Skin is warm and dry.      Coloration: Skin is not jaundiced or pale.     Neurological:      Mental Status: He is alert and oriented to person, place, and time.     Psychiatric:         Mood and Affect: Mood normal.         Behavior: Behavior normal.         Thought Content: Thought content normal.         Judgment: Judgment normal.       Right Ankle Exam     Muscle Strength   Dorsiflexion:  5/5  Plantar " flexion:  5/5       Left Ankle Exam     Muscle Strength   Dorsiflexion:  5/5       Right Knee Exam     Muscle Strength   The patient has normal right knee strength.    Tenderness   Right knee tenderness location: Patellar facet.    Range of Motion   Extension:  normal   Flexion:  120     Tests   Tulio:  Medial - negative Lateral - negative  Varus: negative Valgus: negative    Other   Swelling: mild    Comments:  Positive patella inhibition and grind      Right Hip Exam     Muscle Strength   Abduction: 4/5   Flexion: 4/5     Tests   DOUG: negative    Comments:  Negative FADDIR  Hamstring tightness      Left Hip Exam     Muscle Strength   Abduction: 4/5   Flexion: 4/5     Tests   DOUG: negative    Comments:  Negative FADDIR  Hamstring tightness               I have personally reviewed pertinent films in PACS and my interpretation is  .   X-rays of the right knee noted for mild degenerative changes without acute osseous abnormality.    Administrative Statements  I have spent a total time of 40 minutes in caring for this patient on the day of the visit/encounter including Diagnostic results, Prognosis, Risks and benefits of tx options, Instructions for management, Patient and family education, Impressions, Documenting in the medical record, Reviewing/placing orders in the medical record (including tests, medications, and/or procedures), and Obtaining or reviewing history  .

## 2025-06-23 ENCOUNTER — EVALUATION (OUTPATIENT)
Dept: PHYSICAL THERAPY | Facility: CLINIC | Age: 60
End: 2025-06-23
Attending: FAMILY MEDICINE
Payer: COMMERCIAL

## 2025-06-23 DIAGNOSIS — R29.898 WEAKNESS OF BOTH HIPS: ICD-10-CM

## 2025-06-23 DIAGNOSIS — M22.2X1 PATELLOFEMORAL SYNDROME OF RIGHT KNEE: ICD-10-CM

## 2025-06-23 DIAGNOSIS — M17.11 PRIMARY OSTEOARTHRITIS OF RIGHT KNEE: ICD-10-CM

## 2025-06-23 DIAGNOSIS — M62.9 HAMSTRING TIGHTNESS OF BOTH LOWER EXTREMITIES: ICD-10-CM

## 2025-06-23 PROCEDURE — 97110 THERAPEUTIC EXERCISES: CPT | Performed by: PHYSICAL THERAPIST

## 2025-06-23 PROCEDURE — 97161 PT EVAL LOW COMPLEX 20 MIN: CPT | Performed by: PHYSICAL THERAPIST

## 2025-06-23 NOTE — PROGRESS NOTES
PT Evaluation     Today's date: 2025  Patient name: Roni Negron  : 1965  MRN: [id number]  Referring provider: Han Roman*  Dx:   Encounter Diagnosis     ICD-10-CM    1. Primary osteoarthritis of right knee  M17.11 Ambulatory Referral to Physical Therapy      2. Patellofemoral syndrome of right knee  M22.2X1 Ambulatory Referral to Physical Therapy      3. Weakness of both hips  R29.898 Ambulatory Referral to Physical Therapy      4. Hamstring tightness of both lower extremities  M62.9 Ambulatory Referral to Physical Therapy          Start Time: 1450  Stop Time: 1535  Total time in clinic (min): 45 minutes    Assessment  Impairments: abnormal or restricted ROM, activity intolerance, impaired physical strength, lacks appropriate home exercise program, pain with function, weight-bearing intolerance and poor body mechanics    Assessment details: Roni Negron is a 60 y.o. male who presents with acute onset of anterior and posterior right knee pain with insidious onset. Patient describes pain with squatting, ambulating, bending/straigthening his knee and negotiating steps. Patient has been wearing a patellar stabilizing orthosis, which has significantly helped. Examination findings demonstrate mild hip girdle weakness, knee ROM WFL but painful at end range, patellar apprehension and pain, and painful functional mobility. Patient's clinical presentation is consistent with their referring diagnosis of right patellofemoral pain syndrome. Patient would benefit from skilled physical therapy to address their aforementioned impairments, improve their level of function and to improve their overall quality of life.  Understanding of Dx/Px/POC: excellent     Prognosis: excellent    Goals  Short Term Goals: to be achieved in 4 weeks  1) Patient to be independent with basic HEP.  2) Decrease pain at it's worst to 3/10 on VAS.  3) Increase LE strength by 1/2 MMT grade in all deficient planes.  4)  Patient to report decreased sleep interruption secondary to pain.  5) Patient to negotiate steps with a reciprocal pattern with use of HR.  6) Increase ambulatory tolerance by 10 minutes.    Long Term Goals: to be achieved by discharge  1) FOTO equal to or greater than 67.  2) Patient to be independent with comprehensive HEP.  3) Abolish pain for improved quality of life.  4) Increase LE strength to 5/5 MMT grade in all planes to improve A/IADLS.  5) Patient to negotiate steps with a reciprocal pattern without use of Hr.  6) Increase ambulatory tolerance to 60 minutes.  7) Patient to report no sleep interruption secondary to pain.    Plan  Patient would benefit from: skilled PT  Planned modality interventions: biofeedback, cryotherapy, hydrotherapy, TENS, unattended electrical stimulation and low level laser therapy    Planned therapy interventions: activity modification, ADL retraining, balance, balance/weight bearing training, behavior modification, body mechanics training, functional ROM exercises, gait training, home exercise program, IADL retraining, joint mobilization, manual therapy, massage, neuromuscular re-education, patient education, strengthening, stretching, therapeutic activities, therapeutic exercise and transfer training    Frequency: 1-2x week  Duration in weeks: 12  Plan of Care beginning date: 6/23/2025  Plan of Care expiration date: 9/15/2025  Treatment plan discussed with: patient and family (Patient's wife present for part of IE.)        Subjective Evaluation    History of Present Illness  Mechanism of injury: Patient presents with h/o acute right knee pain beginning over the past 2 months. Patient describes onset of pain as insidious. Patient believes he may have been compensating from dealing with plantar fasciitis, which has since resolved. Patient's quality of sleep is normal. Patient has pain when straightening or bending his knee fully and changes in position. Patient has had increased  "pain in the morning, but this has been easing. Patient has pain when standing after sitting. Patient's pain has been significantly better since using his patellar stabilizing brace. Patient has been taking Meloxicam as prescribed. Patient denies experiencing tingling/numbness or weakness. Patient has intermittent popping in knee.     XR right knee: \"No acute fracture or dislocation.     No joint effusion.     Moderate tricompartmental osteoarthritis, evidenced by articular cartilage loss, marginal osteophytes, and subchondral sclerosis.\"  Patient Goals  Patient goals for therapy: decreased pain    Pain  Current pain ratin  At best pain rating: 3  At worst pain ratin  Location: right patellofemoral joint, popliteal space    Social Support    Employment status: working ( for [employer])        Objective     Tenderness   Left Knee   No tenderness in the lateral joint line, lateral patella, medial joint line, medial patella and popliteal fossa.     Right Knee   Tenderness in the lateral patella and medial patella. No tenderness in the lateral joint line, medial joint line and popliteal fossa.     Active Range of Motion   Left Knee   Flexion: WFL  Extension: WFL    Right Knee   Flexion: WFL and with pain  Extension: WFL and with pain    Passive Range of Motion   Left Knee   Flexion: WFL  Extension: WFL    Right Knee   Flexion: WFL and with pain  Extension: WFL and with pain    Mobility   Patellar Mobility:   Left Knee   Hypermobile: left medial, left lateral, left superior and left inferior      Right Knee   Hypermobile: medial, lateral, superior and inferior     Patellar Mobility Comments   Right medial tendon comments: (+) pain, apprehension. Right lateral tendon comments: (+) pain, apprehension. Right superior tendon comments: (+) pain, apprehension. Right inferior tendon comments: (+) pain, apprehension.     Strength/Myotome Testing     Left Hip   Planes of Motion   Flexion: 5  Abduction: 4+    Right " "Hip   Planes of Motion   Flexion: 5  Abduction: 4+    Left Knee   Flexion: 5  Extension: 5    Right Knee   Flexion: 5  Extension: 5 ((+) pain)    Left Ankle/Foot   Dorsiflexion: 5    Right Ankle/Foot   Dorsiflexion: 5    Tests     Left Knee   Negative anterior drawer, lateral Tulio, medial Tulio, patellar apprehension, patella-femoral grind, valgus stress test at 0 degrees and varus stress test at 0 degrees.     Right Knee   Positive patellar apprehension and patella-femoral grind.   Negative anterior drawer, lateral Tulio, medial Tulio, valgus stress test at 0 degrees and varus stress test at 0 degrees.     Ambulation   Weight-Bearing Status   Weight-Bearing Status (Left): full weight bearing   Weight-Bearing Status (Right): full weight-bearing    Assistive device used: none    Ambulation: Level Surfaces   Ambulation without assistive device: independent    Observational Gait   Gait: within functional limits     Functional Assessment      Squat    Left within functional limits and right within functional limits.     Forward Step Up 8\"     Right Leg  Within functional limits and pain.     Forward Step Down 8\"     Right Leg  Within functional limits and pain.                POC expires Unit limit Auth  expiration date PT/OT + Visit Limit?   9/15/25 N/A N/A BOMN                 Visit/Unit Tracking  AUTH Status:  Date 6/23              N/A Used 1               Remaining                  Precautions: standard      Manuals 6/23                                                                Neuro Re-Ed                                                                                                        Ther Ex             Patient education: pathophysiology, differential diagnosis, diagnostic imaging review, HEP review, bracing GR            Upright bike             Prone quad str. Reviewed            Supine SLR Reviewed            Side lying hip abduction Reviewed            Standing hip abd, ext with TB      "        Sidestepping with TB             LAQ             Standing h/s curl                                                                                           Ther Activity             Squats             LSD             Lunge             Gait Training                                       Modalities                                       Access Code: [email or web address]  URL: [email or web address]  Date: 06/23/2025  Prepared by: Scot Jorge    Exercises  - Sidelying Hip Abduction  - 1 x daily - 4 x weekly - 2-3 sets - 10 reps  - Active Straight Leg Raise with Quad Set  - 1 x daily - 4 x weekly - 2-3 sets - 10 reps  - Prone Quadriceps Stretch with Strap  - 1 x daily - 4 x weekly - 3 sets - 1 reps - 30 seconds hold

## 2025-06-25 ENCOUNTER — OFFICE VISIT (OUTPATIENT)
Dept: PHYSICAL THERAPY | Facility: CLINIC | Age: 60
End: 2025-06-25
Attending: FAMILY MEDICINE
Payer: COMMERCIAL

## 2025-06-25 DIAGNOSIS — M22.2X1 PATELLOFEMORAL SYNDROME OF RIGHT KNEE: ICD-10-CM

## 2025-06-25 DIAGNOSIS — M62.9 HAMSTRING TIGHTNESS OF BOTH LOWER EXTREMITIES: ICD-10-CM

## 2025-06-25 DIAGNOSIS — M17.11 PRIMARY OSTEOARTHRITIS OF RIGHT KNEE: Primary | ICD-10-CM

## 2025-06-25 DIAGNOSIS — R29.898 WEAKNESS OF BOTH HIPS: ICD-10-CM

## 2025-06-25 PROCEDURE — 97110 THERAPEUTIC EXERCISES: CPT

## 2025-06-25 PROCEDURE — 97530 THERAPEUTIC ACTIVITIES: CPT

## 2025-06-25 NOTE — PROGRESS NOTES
"Daily Note     Today's date: 2025  Patient name: Roni Negron  : 1965  MRN: [id number]  Referring provider: Han Roman*  Dx:   Encounter Diagnosis     ICD-10-CM    1. Primary osteoarthritis of right knee  M17.11       2. Patellofemoral syndrome of right knee  M22.2X1       3. Weakness of both hips  R29.898       4. Hamstring tightness of both lower extremities  M62.9           Start Time: 733  Stop Time: 815  Total time in clinic (min): 42 minutes    Subjective: Pt noted that today is a good day.       Objective: See treatment diary below      Assessment:  Continued with treatment session with focus on R knee.   Verbal cues required for proper form and technique of exercises completion at this time. Tolerated treatment well. Patient exhibited good technique with therapeutic exercises and would benefit from continued PT  S.p treatment session, no immediate changes reported.   Advised and reviewed may have some additional DOMS may be noted.   HEP reviewed importance to continue completion. Received a RTB for home use.     Plan: Continue per plan of care.        POC expires Unit limit Auth  expiration date PT/OT + Visit Limit?   9/15/25 N/A N/A BOMN                 Visit/Unit Tracking  AUTH Status:  Date              N/A Used 1 2              Remaining                  Precautions: standard    Access Code: [email or web address] - updated in [facility] epic.   Manuals                                                                Neuro Re-Ed                                                                                                        Ther Ex             Patient education: pathophysiology, differential diagnosis, diagnostic imaging review, HEP review, bracing GR DOMS and HEP            Upright bike  10 min L1            Prone quad str. Reviewed 30\"x 3 b/l            Supine SLR Reviewed 1x 10            Side lying hip abduction Reviewed 1x 10            Standing hip abd, ext " "with TB  RTB 1x 10 3\"            Sidestepping with TB  5 laps at mirror  Add TB           LAQ  2x 10 b/l            Standing h/s curl  1x 10                                                                                          Ther Activity             Squats  At [email or web address]  VC's           LSD             Lunge                                       Gait Training                                       Modalities                                       Access Code: [email or web address]  URL: [email or web address]  Date: 06/23/2025  Prepared by: Scot Jorge    Exercises  - Sidelying Hip Abduction  - 1 x daily - 4 x weekly - 2-3 sets - 10 reps  - Active Straight Leg Raise with Quad Set  - 1 x daily - 4 x weekly - 2-3 sets - 10 reps  - Prone Quadriceps Stretch with Strap  - 1 x daily - 4 x weekly - 3 sets - 1 reps - 30 seconds hold         "

## 2025-06-30 ENCOUNTER — OFFICE VISIT (OUTPATIENT)
Dept: PHYSICAL THERAPY | Facility: CLINIC | Age: 60
End: 2025-06-30
Attending: FAMILY MEDICINE
Payer: COMMERCIAL

## 2025-06-30 DIAGNOSIS — M22.2X1 PATELLOFEMORAL SYNDROME OF RIGHT KNEE: ICD-10-CM

## 2025-06-30 DIAGNOSIS — M17.11 PRIMARY OSTEOARTHRITIS OF RIGHT KNEE: Primary | ICD-10-CM

## 2025-06-30 DIAGNOSIS — R29.898 WEAKNESS OF BOTH HIPS: ICD-10-CM

## 2025-06-30 DIAGNOSIS — M62.9 HAMSTRING TIGHTNESS OF BOTH LOWER EXTREMITIES: ICD-10-CM

## 2025-06-30 PROCEDURE — 97110 THERAPEUTIC EXERCISES: CPT | Performed by: PHYSICAL THERAPIST

## 2025-06-30 PROCEDURE — 97530 THERAPEUTIC ACTIVITIES: CPT | Performed by: PHYSICAL THERAPIST

## 2025-06-30 NOTE — PROGRESS NOTES
"Daily Note     Today's date: 2025  Patient name: Roni Negron  : 1965  MRN: [id number]  Referring provider: Han Roman*  Dx:   Encounter Diagnosis     ICD-10-CM    1. Primary osteoarthritis of right knee  M17.11       2. Patellofemoral syndrome of right knee  M22.2X1       3. Weakness of both hips  R29.898       4. Hamstring tightness of both lower extremities  M62.9           Start Time: 1211  Stop Time: 1300  Total time in clinic (min): 49 minutes    Subjective: Patient reports that his knee is a little more painful over the past 2 or 3 days. Patient had no increase in pain following his last session.      Objective: See treatment diary below      Assessment: Tolerated treatment well. Patient demonstrated fatigue post treatment and would benefit from continued PT. Updated and reviewed HEP. Patient challenged with modifications.      Plan: Continue per plan of care.  Progress treatment as tolerated.         POC expires Unit limit Auth  expiration date PT/OT + Visit Limit?   9/15/25 N/A N/A BOMN                 Visit/Unit Tracking  AUTH Status:  Date             N/A Used 1 2 3             Remaining                  Precautions: standard    Access Code: [email or web address] - updated in [facility] epic.   Manuals                                                               Neuro Re-Ed                                                                                                        Ther Ex             Patient education: pathophysiology, differential diagnosis, diagnostic imaging review, HEP review, bracing GR DOMS and HEP  HEP review          Upright bike  10 min L1  Rec bike 10 min L1          Prone quad str. Reviewed 30\"x 3 b/l            Supine SLR Reviewed 1x 10  YTB 2x10 b/l          Side lying hip abduction Reviewed 1x 10  YTB 2x10 b/l          Standing hip abd, ext with TB  RTB 1x 10 3\"  RTB in SLS off 2\" step x20 ea. B/l          Sidestepping with TB  5 laps at " "mirror  RTB 2 laps          LAQ  2x 10 b/l            Standing h/s curl  1x 10                                                                                          Ther Activity             Squats  At [email or web address]  VC's           LSD   6\" 2x10 b/l          Lunge   2x10 b/l                                    Gait Training                                       Modalities                                       Access Code: [email or web address]  URL: [email or web address]  Date: 06/30/2025  Prepared by: Scot Jorge    Exercises  - Prone Quadriceps Stretch with Strap  - 1 x daily - 4 x weekly - 3 reps - 30 hold  - Sidelying Hip Abduction  - 1 x daily - 4 x weekly - 2 sets - 10 reps  - Supine Active Straight Leg Raise  - 1 x daily - 4 x weekly - 2 sets - 10 reps  - Hip Abduction with Resistance Loop  - 1 x daily - 4 x weekly - 2 sets - 10 reps  - Hip Extension with Resistance Loop  - 1 x daily - 4 x weekly - 2 sets - 10 reps  - Standing Hamstring Curl with Chair Support  - 1 x daily - 4 x weekly - 2 sets - 10 reps  - Seated Long Arc Quad  - 1 x daily - 4 x weekly - 2 sets - 10 reps  - Side Stepping with Resistance at Ankles  - 1 x daily - 4 x weekly - 10 reps  - Squat  - 1 x daily - 4 x weekly - 2 sets - 10 reps  - Lateral Step Down  - 1 x daily - 4 x weekly - 2 sets - 10 reps  - Standard Lunge  - 1 x daily - 4 x weekly - 2 sets - 10 reps           "

## 2025-08-14 ENCOUNTER — TELEPHONE (OUTPATIENT)
Age: 60
End: 2025-08-14

## 2025-08-14 ENCOUNTER — OFFICE VISIT (OUTPATIENT)
Age: 60
End: 2025-08-14
Payer: COMMERCIAL

## 2025-08-14 PROBLEM — G47.33 OSA (OBSTRUCTIVE SLEEP APNEA): Status: ACTIVE | Noted: 2025-08-14

## 2025-08-15 ENCOUNTER — TELEPHONE (OUTPATIENT)
Dept: SLEEP CENTER | Facility: CLINIC | Age: 60
End: 2025-08-15